# Patient Record
Sex: FEMALE | Race: WHITE | NOT HISPANIC OR LATINO | Employment: FULL TIME | URBAN - NONMETROPOLITAN AREA
[De-identification: names, ages, dates, MRNs, and addresses within clinical notes are randomized per-mention and may not be internally consistent; named-entity substitution may affect disease eponyms.]

---

## 2020-01-21 ENCOUNTER — APPOINTMENT (OUTPATIENT)
Dept: GENERAL RADIOLOGY | Facility: HOSPITAL | Age: 55
End: 2020-01-21

## 2020-01-21 ENCOUNTER — HOSPITAL ENCOUNTER (EMERGENCY)
Facility: HOSPITAL | Age: 55
Discharge: HOME | End: 2020-01-21
Attending: EMERGENCY MEDICINE | Admitting: EMERGENCY MEDICINE

## 2020-01-21 ENCOUNTER — APPOINTMENT (OUTPATIENT)
Dept: CT IMAGING | Facility: HOSPITAL | Age: 55
End: 2020-01-21

## 2020-01-21 VITALS
SYSTOLIC BLOOD PRESSURE: 141 MMHG | BODY MASS INDEX: 46.29 KG/M2 | HEIGHT: 55 IN | OXYGEN SATURATION: 94 % | TEMPERATURE: 98.1 F | DIASTOLIC BLOOD PRESSURE: 76 MMHG | HEART RATE: 90 BPM | RESPIRATION RATE: 16 BRPM | WEIGHT: 200 LBS

## 2020-01-21 DIAGNOSIS — R55 SYNCOPE, UNSPECIFIED SYNCOPE TYPE: Primary | ICD-10-CM

## 2020-01-21 DIAGNOSIS — F19.10 SUBSTANCE ABUSE (HCC): ICD-10-CM

## 2020-01-21 DIAGNOSIS — E86.0 DEHYDRATION: ICD-10-CM

## 2020-01-21 LAB
ALBUMIN SERPL-MCNC: 4.3 G/DL (ref 3.5–5.2)
ALBUMIN/GLOB SERPL: 1.3 G/DL
ALP SERPL-CCNC: 88 U/L (ref 39–117)
ALT SERPL W P-5'-P-CCNC: 15 U/L (ref 1–33)
AMPHET+METHAMPHET UR QL: POSITIVE
AMPHETAMINES UR QL: POSITIVE
ANION GAP SERPL CALCULATED.3IONS-SCNC: 13.2 MMOL/L (ref 5–15)
AST SERPL-CCNC: 14 U/L (ref 1–32)
BARBITURATES UR QL SCN: NEGATIVE
BASOPHILS # BLD AUTO: 0.07 10*3/MM3 (ref 0–0.2)
BASOPHILS NFR BLD AUTO: 0.5 % (ref 0–1.5)
BENZODIAZ UR QL SCN: NEGATIVE
BILIRUB SERPL-MCNC: 0.3 MG/DL (ref 0.2–1.2)
BUN BLD-MCNC: 14 MG/DL (ref 6–20)
BUN/CREAT SERPL: 9.9 (ref 7–25)
BUPRENORPHINE SERPL-MCNC: NEGATIVE NG/ML
CALCIUM SPEC-SCNC: 10.1 MG/DL (ref 8.6–10.5)
CANNABINOIDS SERPL QL: POSITIVE
CHLORIDE SERPL-SCNC: 100 MMOL/L (ref 98–107)
CO2 SERPL-SCNC: 25.8 MMOL/L (ref 22–29)
COCAINE UR QL: NEGATIVE
CREAT BLD-MCNC: 1.41 MG/DL (ref 0.57–1)
D DIMER PPP FEU-MCNC: 0.49 MCGFEU/ML (ref 0–0.57)
DEPRECATED RDW RBC AUTO: 43.7 FL (ref 37–54)
EOSINOPHIL # BLD AUTO: 0.1 10*3/MM3 (ref 0–0.4)
EOSINOPHIL NFR BLD AUTO: 0.7 % (ref 0.3–6.2)
ERYTHROCYTE [DISTWIDTH] IN BLOOD BY AUTOMATED COUNT: 12.7 % (ref 12.3–15.4)
GFR SERPL CREATININE-BSD FRML MDRD: 39 ML/MIN/1.73
GLOBULIN UR ELPH-MCNC: 3.3 GM/DL
GLUCOSE BLD-MCNC: 123 MG/DL (ref 65–99)
HCT VFR BLD AUTO: 45.4 % (ref 34–46.6)
HGB BLD-MCNC: 14.8 G/DL (ref 12–15.9)
HOLD SPECIMEN: NORMAL
HOLD SPECIMEN: NORMAL
IMM GRANULOCYTES # BLD AUTO: 0.05 10*3/MM3 (ref 0–0.05)
IMM GRANULOCYTES NFR BLD AUTO: 0.3 % (ref 0–0.5)
LYMPHOCYTES # BLD AUTO: 1.89 10*3/MM3 (ref 0.7–3.1)
LYMPHOCYTES NFR BLD AUTO: 12.6 % (ref 19.6–45.3)
MCH RBC QN AUTO: 30.3 PG (ref 26.6–33)
MCHC RBC AUTO-ENTMCNC: 32.6 G/DL (ref 31.5–35.7)
MCV RBC AUTO: 93 FL (ref 79–97)
METHADONE UR QL SCN: NEGATIVE
MONOCYTES # BLD AUTO: 1.22 10*3/MM3 (ref 0.1–0.9)
MONOCYTES NFR BLD AUTO: 8.1 % (ref 5–12)
NEUTROPHILS # BLD AUTO: 11.65 10*3/MM3 (ref 1.7–7)
NEUTROPHILS NFR BLD AUTO: 77.8 % (ref 42.7–76)
NRBC BLD AUTO-RTO: 0 /100 WBC (ref 0–0.2)
NT-PROBNP SERPL-MCNC: 628.3 PG/ML (ref 5–900)
OPIATES UR QL: NEGATIVE
OXYCODONE UR QL SCN: NEGATIVE
PCP UR QL SCN: NEGATIVE
PLATELET # BLD AUTO: 269 10*3/MM3 (ref 140–450)
PMV BLD AUTO: 9.2 FL (ref 6–12)
POTASSIUM BLD-SCNC: 4.5 MMOL/L (ref 3.5–5.2)
PROPOXYPH UR QL: NEGATIVE
PROT SERPL-MCNC: 7.6 G/DL (ref 6–8.5)
RBC # BLD AUTO: 4.88 10*6/MM3 (ref 3.77–5.28)
SODIUM BLD-SCNC: 139 MMOL/L (ref 136–145)
TRICYCLICS UR QL SCN: NEGATIVE
TROPONIN T SERPL-MCNC: <0.01 NG/ML (ref 0–0.03)
WBC NRBC COR # BLD: 14.98 10*3/MM3 (ref 3.4–10.8)
WHOLE BLOOD HOLD SPECIMEN: NORMAL
WHOLE BLOOD HOLD SPECIMEN: NORMAL

## 2020-01-21 PROCEDURE — 73090 X-RAY EXAM OF FOREARM: CPT

## 2020-01-21 PROCEDURE — 93005 ELECTROCARDIOGRAM TRACING: CPT | Performed by: EMERGENCY MEDICINE

## 2020-01-21 PROCEDURE — 80053 COMPREHEN METABOLIC PANEL: CPT | Performed by: EMERGENCY MEDICINE

## 2020-01-21 PROCEDURE — 85379 FIBRIN DEGRADATION QUANT: CPT | Performed by: EMERGENCY MEDICINE

## 2020-01-21 PROCEDURE — 99284 EMERGENCY DEPT VISIT MOD MDM: CPT

## 2020-01-21 PROCEDURE — 80306 DRUG TEST PRSMV INSTRMNT: CPT | Performed by: EMERGENCY MEDICINE

## 2020-01-21 PROCEDURE — 84484 ASSAY OF TROPONIN QUANT: CPT | Performed by: EMERGENCY MEDICINE

## 2020-01-21 PROCEDURE — 70450 CT HEAD/BRAIN W/O DYE: CPT

## 2020-01-21 PROCEDURE — 72170 X-RAY EXAM OF PELVIS: CPT

## 2020-01-21 PROCEDURE — 85025 COMPLETE CBC W/AUTO DIFF WBC: CPT | Performed by: EMERGENCY MEDICINE

## 2020-01-21 PROCEDURE — 83880 ASSAY OF NATRIURETIC PEPTIDE: CPT | Performed by: EMERGENCY MEDICINE

## 2020-01-21 RX ADMIN — SODIUM CHLORIDE 1000 ML: 9 INJECTION, SOLUTION INTRAVENOUS at 04:46

## 2020-01-21 NOTE — ED PROVIDER NOTES
Subjective   54-year-old female presenting with syncope.  She states that she took a few gabapentin before going to work tonight.  At work she fell asleep in the break room.  She then stood up and had a syncopal episode.  She tells me that she had a total of 4 or 5 of these episodes where she would pass out.  Unsure of how long she was out total.  At this time she complains of a headache, bilateral hip soreness and a few scattered wounds and contusions.  She denies any shortness of breath, numbness, weakness, fevers, chills, chest pain, abdominal pain.  She does not take blood thinners.          Review of Systems   Constitutional: Negative.    Eyes: Negative.    Respiratory: Negative.    Cardiovascular: Negative.    Gastrointestinal: Negative.    Genitourinary: Negative.    Musculoskeletal: Negative.    Skin: Positive for wound.   Neurological: Positive for syncope, light-headedness and headaches. Negative for dizziness, seizures, facial asymmetry, speech difficulty, weakness and numbness.   Psychiatric/Behavioral: Negative.        Past Medical History:   Diagnosis Date   • COPD (chronic obstructive pulmonary disease) (CMS/[facility])        Allergies   Allergen Reactions   • Amoxicillin Hives   • Codeine Swelling       History reviewed. No pertinent surgical history.    History reviewed. No pertinent family history.    Social History     Socioeconomic History   • Marital status:      Spouse name: Not on file   • Number of children: Not on file   • Years of education: Not on file   • Highest education level: Not on file   Tobacco Use   • Smoking status: Current Every Day Smoker     Packs/day: 1.00   Substance and Sexual Activity   • Alcohol use: Never     Frequency: Never   • Drug use: Yes     Comment: neurontin; marijuana   • Sexual activity: Defer           Objective   Physical Exam   Constitutional: She is oriented to person, place, and time. She appears well-developed and well-nourished. No distress.   HENT:    Head: Normocephalic.   Right Ear: External ear normal.   Left Ear: External ear normal.   Nose: Nose normal.   Mouth/Throat: Oropharynx is clear and moist.   Occlusion normal, midface stable   Eyes: Pupils are equal, round, and reactive to light. Conjunctivae and EOM are normal.   Neck: Normal range of motion. Neck supple.   No midline tenderness, full range of motion   Cardiovascular: Regular rhythm, normal heart sounds and intact distal pulses.   Tachycardic   Pulmonary/Chest: Effort normal and breath sounds normal. No respiratory distress.   Abdominal: Soft. Bowel sounds are normal. She exhibits no distension. There is no tenderness. There is no rebound and no guarding.   Musculoskeletal: Normal range of motion. She exhibits no edema or deformity.   Contusion and tenderness to the left forearm, mild tenderness over both hips, all other extremities/joint stable without tenderness   Neurological: She is alert and oriented to person, place, and time.   Normal strength and sensation, cranial nerves intact   Skin: Skin is warm and dry. No rash noted.   Small skin tear to the right forehead, laceration under the left eye, abrasion under the chin   Psychiatric: She has a normal mood and affect. Her behavior is normal.   Nursing note and vitals reviewed.      Laceration Repair  Date/Time: 1/21/2020 6:18 AM  Performed by: Delgado Marshall MD  Authorized by: Delgado Marshall MD     Consent:     Consent obtained:  Verbal    Consent given by:  Patient    Risks discussed:  Infection, pain and poor cosmetic result    Alternatives discussed:  No treatment  Anesthesia (see MAR for exact dosages):     Anesthesia method:  None  Laceration details:     Location:  Face    Face location:  L cheek    Length (cm):  1    Depth (mm):  2  Repair type:     Repair type:  Simple  Pre-procedure details:     Preparation:  Patient was prepped and draped in usual sterile fashion  Exploration:     Hemostasis achieved with:  Direct  pressure    Wound exploration: entire depth of wound probed and visualized      Contaminated: no    Treatment:     Area cleansed with:  Saline    Amount of cleaning:  Standard    Irrigation solution:  Sterile saline    Irrigation method:  Syringe  Skin repair:     Repair method:  Tissue adhesive  Approximation:     Approximation:  Close  Post-procedure details:     Dressing:  Open (no dressing)    Patient tolerance of procedure:  Tolerated well, no immediate complications               ED Course                                               MDM  Number of Diagnoses or Management Options  Dehydration:   Substance abuse (CMS/HCC):   Syncope, unspecified syncope type:   Diagnosis management comments: 54-year-old female with syncope.  Well-developed, well-nourished female no distress with exam as above.  Other than being tachycardic her vital signs are normal here.  Suspect this is related to her illicit use of gabapentin.  Will obtain labs, imaging, EKG.  Given she is tachycardic we will check d-dimer.  The wound under the left eye does need to be repaired, likely with glue.  Will update tetanus.  Disposition pending.    DDX: Substance abuse, syncope, arrhythmia, dehydration, PE    EKG interpreted by me: Sinus tachycardia, no acute ST/T changes, this is an abnormal EKG    Lab work is notable for mildly elevated creatinine at unknown chronicity.  Imaging is negative for any acute findings.  Urine drug screen is positive for methamphetamine.  She feels much better after some fluids.  I do feel she is safe for discharge home.  Encouraged oral hydration and outpatient follow-up.  She is comfortable with and understanding of the plan.       Amount and/or Complexity of Data Reviewed  Clinical lab tests: reviewed  Decide to obtain previous medical records or to obtain history from someone other than the patient: yes        Final diagnoses:   Syncope, unspecified syncope type   Dehydration   Substance abuse (CMS/HCC)             Delgado Marshall MD  01/21/20 0619

## 2020-06-24 ENCOUNTER — APPOINTMENT (OUTPATIENT)
Dept: GENERAL RADIOLOGY | Facility: HOSPITAL | Age: 55
End: 2020-06-24

## 2020-06-24 ENCOUNTER — HOSPITAL ENCOUNTER (EMERGENCY)
Facility: HOSPITAL | Age: 55
Discharge: HOME | End: 2020-06-24
Attending: EMERGENCY MEDICINE | Admitting: EMERGENCY MEDICINE

## 2020-06-24 VITALS
WEIGHT: 199.2 LBS | OXYGEN SATURATION: 95 % | RESPIRATION RATE: 18 BRPM | DIASTOLIC BLOOD PRESSURE: 81 MMHG | TEMPERATURE: 98.8 F | HEART RATE: 92 BPM | BODY MASS INDEX: 32.02 KG/M2 | SYSTOLIC BLOOD PRESSURE: 126 MMHG | HEIGHT: 66 IN

## 2020-06-24 DIAGNOSIS — M25.572 ARTHRALGIA OF LEFT ANKLE: Primary | ICD-10-CM

## 2020-06-24 PROCEDURE — 99282 EMERGENCY DEPT VISIT SF MDM: CPT

## 2020-06-24 PROCEDURE — 73610 X-RAY EXAM OF ANKLE: CPT

## 2020-06-24 RX ORDER — MELOXICAM 7.5 MG/1
7.5 TABLET ORAL DAILY
Qty: 14 TABLET | Refills: 0 | Status: SHIPPED | OUTPATIENT
Start: 2020-06-24

## 2020-06-24 NOTE — ED PROVIDER NOTES
Subjective   History of Present Illness  This a 54-year-old female who comes in today complaining of right ankle pain.  She reports that she has a history of an ankle fracture with plates and screws in the past.  She twisted her ankle yesterday and the swelling has continued.  Today advised her from her work that she needed to come here for evaluation.  Review of Systems   Constitutional: Negative.    HENT: Negative.    Eyes: Negative.    Respiratory: Negative.    Cardiovascular: Negative.    Gastrointestinal: Negative.    Genitourinary: Negative.    Musculoskeletal: Positive for arthralgias.   Skin: Negative.    Neurological: Negative.    Psychiatric/Behavioral: Negative.        Past Medical History:   Diagnosis Date   • COPD (chronic obstructive pulmonary disease) (CMS/[facility])        Allergies   Allergen Reactions   • Amoxicillin Hives   • Codeine Swelling       History reviewed. No pertinent surgical history.    History reviewed. No pertinent family history.    Social History     Socioeconomic History   • Marital status:      Spouse name: Not on file   • Number of children: Not on file   • Years of education: Not on file   • Highest education level: Not on file   Tobacco Use   • Smoking status: Current Every Day Smoker     Packs/day: 1.00   Substance and Sexual Activity   • Alcohol use: Never     Frequency: Never   • Drug use: Yes     Comment: neurontin; marijuana   • Sexual activity: Defer           Objective   Physical Exam   Constitutional: She appears well-developed and well-nourished.   Nursing note and vitals reviewed.  GEN: No acute distress  Head: Normocephalic, atraumatic  Eyes: Pupils equal round reactive to light  ENT: Posterior pharynx normal in appearance, oral mucosa is moist  Chest: Nontender to palpation  Cardiovascular: Regular rate  Lungs: Clear to auscultation bilaterally  Abdomen: Soft, nontender, nondistended, no peritoneal signs  Extremities: Left ankle edema, normal appearance  Neuro:  GCS 15  Psych: Mood and affect are appropriate      Procedures           ED Course  ED Course as of Jun 24 1556   Wed Jun 24, 2020   1553 I discussed the findings with the patient.  I have advised her that she has some changes to the ankle.  This could be chronic as she does have a history.  We are going to send her to Dr. Faria for further evaluation.    [TW]      ED Course User Index  [TW] Vesta Newsome, LEO                                           [facility]    Final diagnoses:   Arthralgia of left ankle            Vesta Newsome APRN  06/24/20 1551

## 2021-10-20 ENCOUNTER — TRANSCRIBE ORDERS (OUTPATIENT)
Dept: ADMINISTRATIVE | Facility: HOSPITAL | Age: 56
End: 2021-10-20

## 2021-10-20 DIAGNOSIS — Z12.31 BREAST CANCER SCREENING BY MAMMOGRAM: Primary | ICD-10-CM

## 2022-01-31 ENCOUNTER — LAB (OUTPATIENT)
Dept: LAB | Facility: HOSPITAL | Age: 57
End: 2022-01-31

## 2022-01-31 DIAGNOSIS — Z03.818 ENCOUNTER FOR PATIENT CONCERN ABOUT EXPOSURE TO INFECTIOUS ORGANISM: Primary | ICD-10-CM

## 2022-01-31 LAB — SARS-COV-2 RNA NOSE QL NAA+PROBE: NOT DETECTED

## 2022-01-31 PROCEDURE — C9803 HOPD COVID-19 SPEC COLLECT: HCPCS

## 2022-01-31 PROCEDURE — U0004 COV-19 TEST NON-CDC HGH THRU: HCPCS

## 2022-04-15 ENCOUNTER — APPOINTMENT (OUTPATIENT)
Dept: GENERAL RADIOLOGY | Facility: HOSPITAL | Age: 57
End: 2022-04-15

## 2022-04-15 ENCOUNTER — HOSPITAL ENCOUNTER (EMERGENCY)
Facility: HOSPITAL | Age: 57
Discharge: HOME | End: 2022-04-15
Attending: EMERGENCY MEDICINE | Admitting: EMERGENCY MEDICINE

## 2022-04-15 VITALS
HEIGHT: 66 IN | SYSTOLIC BLOOD PRESSURE: 170 MMHG | TEMPERATURE: 98.7 F | OXYGEN SATURATION: 95 % | BODY MASS INDEX: 29.41 KG/M2 | RESPIRATION RATE: 16 BRPM | WEIGHT: 183 LBS | HEART RATE: 97 BPM | DIASTOLIC BLOOD PRESSURE: 99 MMHG

## 2022-04-15 DIAGNOSIS — J44.9 BRONCHITIS WITH CHRONIC AIRWAY OBSTRUCTION: Primary | ICD-10-CM

## 2022-04-15 PROCEDURE — 71046 X-RAY EXAM CHEST 2 VIEWS: CPT

## 2022-04-15 PROCEDURE — 94664 DEMO&/EVAL PT USE INHALER: CPT

## 2022-04-15 PROCEDURE — 94761 N-INVAS EAR/PLS OXIMETRY MLT: CPT

## 2022-04-15 PROCEDURE — 99283 EMERGENCY DEPT VISIT LOW MDM: CPT

## 2022-04-15 PROCEDURE — 94640 AIRWAY INHALATION TREATMENT: CPT

## 2022-04-15 PROCEDURE — 94799 UNLISTED PULMONARY SVC/PX: CPT

## 2022-04-15 PROCEDURE — 0202U NFCT DS 22 TRGT SARS-COV-2: CPT | Performed by: NURSE PRACTITIONER

## 2022-04-15 RX ORDER — ALBUTEROL SULFATE 90 UG/1
2 AEROSOL, METERED RESPIRATORY (INHALATION) EVERY 4 HOURS PRN
Qty: 17 G | Refills: 0 | Status: SHIPPED | OUTPATIENT
Start: 2022-04-15

## 2022-04-15 RX ORDER — PREDNISONE 20 MG/1
20 TABLET ORAL 2 TIMES DAILY
Qty: 10 TABLET | Refills: 0 | Status: SHIPPED | OUTPATIENT
Start: 2022-04-15

## 2022-04-15 RX ORDER — IPRATROPIUM BROMIDE AND ALBUTEROL SULFATE 2.5; .5 MG/3ML; MG/3ML
3 SOLUTION RESPIRATORY (INHALATION) ONCE
Status: COMPLETED | OUTPATIENT
Start: 2022-04-15 | End: 2022-04-15

## 2022-04-15 RX ORDER — DOXYCYCLINE 100 MG/1
100 CAPSULE ORAL 2 TIMES DAILY
Qty: 20 CAPSULE | Refills: 0 | Status: SHIPPED | OUTPATIENT
Start: 2022-04-15

## 2022-04-15 RX ADMIN — IPRATROPIUM BROMIDE AND ALBUTEROL SULFATE 3 ML: 2.5; .5 SOLUTION RESPIRATORY (INHALATION) at 17:32

## 2022-04-15 NOTE — ED PROVIDER NOTES
Subjective   History of Present Illness  This is a 56 year old female who comes in today complaining of cough and congestion. She reports symptoms about two days ago. Today she reports fever and chills.   Review of Systems   Constitutional: Positive for chills and fever.   HENT: Positive for congestion.    Eyes: Negative.    Respiratory: Positive for cough.    Cardiovascular: Negative.    Gastrointestinal: Negative.    Genitourinary: Negative.    Skin: Negative.    Neurological: Negative.    Psychiatric/Behavioral: Negative.        Past Medical History:   Diagnosis Date   • COPD (chronic obstructive pulmonary disease) ([facility])        Allergies   Allergen Reactions   • Amoxicillin Hives   • Codeine Swelling       History reviewed. No pertinent surgical history.    History reviewed. No pertinent family history.    Social History     Socioeconomic History   • Marital status:    Tobacco Use   • Smoking status: Current Every Day Smoker     Packs/day: 1.00     Types: Cigarettes   • Smokeless tobacco: Never Used   Vaping Use   • Vaping Use: Every day   • Substances: Nicotine, Flavoring   • Devices: Disposable   Substance and Sexual Activity   • Alcohol use: Never   • Drug use: Not Currently     Comment: neurontin; marijuana   • Sexual activity: Defer           Objective   Physical Exam  Vitals and nursing note reviewed.   Constitutional:       Appearance: Normal appearance. She is normal weight.   Neurological:      Mental Status: She is alert.     GEN: No acute distress  Head: Normocephalic, atraumatic  Eyes: Pupils equal round reactive to light  ENT: Posterior pharynx normal in appearance, oral mucosa is moist  Chest: Nontender to palpation  Cardiovascular: Regular rate  Lungs: wheezes throughout.   Abdomen: Soft, nontender, nondistended, no peritoneal signs  Extremities: No edema, normal appearance  Neuro: GCS 15  Psych: Mood and affect are appropriate      Procedures           ED Course  ED Course as of 04/15/22  1744   Fri Apr 15, 2022   1618 The patient did not want labs completed today.  [TW]   1714 She reports improvement after neb. She states she does this a few times a year and usually requires steroids and antibiotics. She is a smoker. I have discussed the findings with the patient. I have advised her to follow up with her pcp in the next 24-48 hours. I have given her return to care instructions and she is agreeable to this plan of care.  [TW]      ED Course User Index  [TW] Vesta Newsome, APRN                                                 MDM  Number of Diagnoses or Management Options     Amount and/or Complexity of Data Reviewed  Tests in the radiology section of CPT®: ordered and reviewed  Review and summarize past medical records: yes  Discuss the patient with other providers: yes  Independent visualization of images, tracings, or specimens: yes    Risk of Complications, Morbidity, and/or Mortality  Presenting problems: low  Diagnostic procedures: low  Management options: low        Final diagnoses:   Bronchitis with chronic airway obstruction (HCC)       ED Disposition  ED Disposition     ED Disposition   Discharge    Condition   Stable    Comment   --             Provider, No Known  [facility] [address]  [phone]    Schedule an appointment as soon as possible for a visit            Medication List      New Prescriptions    albuterol sulfate  (90 Base) MCG/ACT inhaler  Commonly known as: PROVENTIL HFA;VENTOLIN HFA;PROAIR HFA  Inhale 2 puffs Every 4 (Four) Hours As Needed for Wheezing.     doxycycline 100 MG capsule  Commonly known as: MONODOX  Take 1 capsule by mouth 2 (Two) Times a Day.     predniSONE 20 MG tablet  Commonly known as: DELTASONE  Take 1 tablet by mouth 2 (Two) Times a Day.           Where to Get Your Medications      These medications were sent to [facility]/pharmacy #[id number] - [place], [place] - [address] - [phone] [facility] [phone] FX  [address]  [place] [address]    Phone: [phone]   · albuterol sulfate  (90 Base) MCG/ACT inhaler  · doxycycline 100 MG capsule  · predniSONE 20 MG tablet          Vesta Newsome, APRN  04/15/22 8329

## 2022-09-24 ENCOUNTER — APPOINTMENT (OUTPATIENT)
Dept: GENERAL RADIOLOGY | Facility: HOSPITAL | Age: 57
End: 2022-09-24

## 2022-09-24 ENCOUNTER — HOSPITAL ENCOUNTER (EMERGENCY)
Facility: HOSPITAL | Age: 57
Discharge: HOME | End: 2022-09-24
Attending: EMERGENCY MEDICINE | Admitting: EMERGENCY MEDICINE

## 2022-09-24 VITALS
OXYGEN SATURATION: 94 % | HEIGHT: 66 IN | HEART RATE: 105 BPM | RESPIRATION RATE: 18 BRPM | BODY MASS INDEX: 29.73 KG/M2 | WEIGHT: 185 LBS | DIASTOLIC BLOOD PRESSURE: 93 MMHG | TEMPERATURE: 98 F | SYSTOLIC BLOOD PRESSURE: 141 MMHG

## 2022-09-24 DIAGNOSIS — J44.1 COPD EXACERBATION: Primary | ICD-10-CM

## 2022-09-24 LAB
ALBUMIN SERPL-MCNC: 4.2 G/DL (ref 3.5–5.2)
ALBUMIN/GLOB SERPL: 1.6 G/DL
ALP SERPL-CCNC: 105 U/L (ref 39–117)
ALT SERPL W P-5'-P-CCNC: 39 U/L (ref 1–33)
ANION GAP SERPL CALCULATED.3IONS-SCNC: 23 MMOL/L (ref 5–15)
AST SERPL-CCNC: 27 U/L (ref 1–32)
B PARAPERT DNA SPEC QL NAA+PROBE: NOT DETECTED
B PERT DNA SPEC QL NAA+PROBE: NOT DETECTED
BASOPHILS # BLD AUTO: 0.06 10*3/MM3 (ref 0–0.2)
BASOPHILS NFR BLD AUTO: 0.8 % (ref 0–1.5)
BILIRUB SERPL-MCNC: 0.2 MG/DL (ref 0–1.2)
BUN SERPL-MCNC: 19 MG/DL (ref 6–20)
BUN/CREAT SERPL: 26 (ref 7–25)
C PNEUM DNA NPH QL NAA+NON-PROBE: NOT DETECTED
CALCIUM SPEC-SCNC: 9.3 MG/DL (ref 8.6–10.5)
CHLORIDE SERPL-SCNC: 105 MMOL/L (ref 98–107)
CO2 SERPL-SCNC: 24 MMOL/L (ref 22–29)
CREAT SERPL-MCNC: 0.73 MG/DL (ref 0.57–1)
DEPRECATED RDW RBC AUTO: 46.7 FL (ref 37–54)
EGFRCR SERPLBLD CKD-EPI 2021: 96.7 ML/MIN/1.73
EOSINOPHIL # BLD AUTO: 0.3 10*3/MM3 (ref 0–0.4)
EOSINOPHIL NFR BLD AUTO: 4 % (ref 0.3–6.2)
ERYTHROCYTE [DISTWIDTH] IN BLOOD BY AUTOMATED COUNT: 13.4 % (ref 12.3–15.4)
FLUAV SUBTYP SPEC NAA+PROBE: NOT DETECTED
FLUBV RNA ISLT QL NAA+PROBE: NOT DETECTED
GLOBULIN UR ELPH-MCNC: 2.7 GM/DL
GLUCOSE SERPL-MCNC: 107 MG/DL (ref 65–99)
HADV DNA SPEC NAA+PROBE: NOT DETECTED
HCOV 229E RNA SPEC QL NAA+PROBE: NOT DETECTED
HCOV HKU1 RNA SPEC QL NAA+PROBE: NOT DETECTED
HCOV NL63 RNA SPEC QL NAA+PROBE: NOT DETECTED
HCOV OC43 RNA SPEC QL NAA+PROBE: NOT DETECTED
HCT VFR BLD AUTO: 39.6 % (ref 34–46.6)
HGB BLD-MCNC: 12.9 G/DL (ref 12–15.9)
HMPV RNA NPH QL NAA+NON-PROBE: NOT DETECTED
HOLD SPECIMEN: NORMAL
HOLD SPECIMEN: NORMAL
HPIV1 RNA ISLT QL NAA+PROBE: NOT DETECTED
HPIV2 RNA SPEC QL NAA+PROBE: NOT DETECTED
HPIV3 RNA NPH QL NAA+PROBE: NOT DETECTED
HPIV4 P GENE NPH QL NAA+PROBE: NOT DETECTED
IMM GRANULOCYTES # BLD AUTO: 0.02 10*3/MM3 (ref 0–0.05)
IMM GRANULOCYTES NFR BLD AUTO: 0.3 % (ref 0–0.5)
LYMPHOCYTES # BLD AUTO: 2.37 10*3/MM3 (ref 0.7–3.1)
LYMPHOCYTES NFR BLD AUTO: 31.7 % (ref 19.6–45.3)
M PNEUMO IGG SER IA-ACNC: NOT DETECTED
MCH RBC QN AUTO: 30.6 PG (ref 26.6–33)
MCHC RBC AUTO-ENTMCNC: 32.6 G/DL (ref 31.5–35.7)
MCV RBC AUTO: 94.1 FL (ref 79–97)
MONOCYTES # BLD AUTO: 0.73 10*3/MM3 (ref 0.1–0.9)
MONOCYTES NFR BLD AUTO: 9.8 % (ref 5–12)
NEUTROPHILS NFR BLD AUTO: 4 10*3/MM3 (ref 1.7–7)
NEUTROPHILS NFR BLD AUTO: 53.4 % (ref 42.7–76)
NRBC BLD AUTO-RTO: 0 /100 WBC (ref 0–0.2)
NT-PROBNP SERPL-MCNC: 7329 PG/ML (ref 0–900)
PLATELET # BLD AUTO: 206 10*3/MM3 (ref 140–450)
PMV BLD AUTO: 9.4 FL (ref 6–12)
POTASSIUM SERPL-SCNC: 3.9 MMOL/L (ref 3.5–5.2)
PROT SERPL-MCNC: 6.9 G/DL (ref 6–8.5)
RBC # BLD AUTO: 4.21 10*6/MM3 (ref 3.77–5.28)
RHINOVIRUS RNA SPEC NAA+PROBE: NOT DETECTED
RSV RNA NPH QL NAA+NON-PROBE: NOT DETECTED
SARS-COV-2 RNA NPH QL NAA+NON-PROBE: NOT DETECTED
SODIUM SERPL-SCNC: 152 MMOL/L (ref 136–145)
TROPONIN T SERPL-MCNC: <0.01 NG/ML (ref 0–0.03)
WBC NRBC COR # BLD: 7.48 10*3/MM3 (ref 3.4–10.8)
WHOLE BLOOD HOLD COAG: NORMAL
WHOLE BLOOD HOLD SPECIMEN: NORMAL

## 2022-09-24 PROCEDURE — 99284 EMERGENCY DEPT VISIT MOD MDM: CPT

## 2022-09-24 PROCEDURE — 80053 COMPREHEN METABOLIC PANEL: CPT

## 2022-09-24 PROCEDURE — 96374 THER/PROPH/DIAG INJ IV PUSH: CPT

## 2022-09-24 PROCEDURE — 71045 X-RAY EXAM CHEST 1 VIEW: CPT

## 2022-09-24 PROCEDURE — 25010000002 METHYLPREDNISOLONE PER 125 MG: Performed by: EMERGENCY MEDICINE

## 2022-09-24 PROCEDURE — 93005 ELECTROCARDIOGRAM TRACING: CPT

## 2022-09-24 PROCEDURE — 0202U NFCT DS 22 TRGT SARS-COV-2: CPT | Performed by: EMERGENCY MEDICINE

## 2022-09-24 PROCEDURE — 84484 ASSAY OF TROPONIN QUANT: CPT

## 2022-09-24 PROCEDURE — 85025 COMPLETE CBC W/AUTO DIFF WBC: CPT

## 2022-09-24 PROCEDURE — 96375 TX/PRO/DX INJ NEW DRUG ADDON: CPT

## 2022-09-24 PROCEDURE — 94799 UNLISTED PULMONARY SVC/PX: CPT

## 2022-09-24 PROCEDURE — 94761 N-INVAS EAR/PLS OXIMETRY MLT: CPT

## 2022-09-24 PROCEDURE — 25010000002 FUROSEMIDE PER 20 MG: Performed by: EMERGENCY MEDICINE

## 2022-09-24 PROCEDURE — 83880 ASSAY OF NATRIURETIC PEPTIDE: CPT

## 2022-09-24 PROCEDURE — 94640 AIRWAY INHALATION TREATMENT: CPT

## 2022-09-24 RX ORDER — PREDNISONE 20 MG/1
TABLET ORAL
Qty: 18 TABLET | Refills: 0 | Status: SHIPPED | OUTPATIENT
Start: 2022-09-24 | End: 2022-10-03

## 2022-09-24 RX ORDER — METHYLPREDNISOLONE SODIUM SUCCINATE 125 MG/2ML
125 INJECTION, POWDER, LYOPHILIZED, FOR SOLUTION INTRAMUSCULAR; INTRAVENOUS ONCE
Status: COMPLETED | OUTPATIENT
Start: 2022-09-24 | End: 2022-09-24

## 2022-09-24 RX ORDER — ALBUTEROL SULFATE 0.63 MG/3ML
1 SOLUTION RESPIRATORY (INHALATION) EVERY 6 HOURS PRN
Qty: 20 EACH | Refills: 0 | Status: SHIPPED | OUTPATIENT
Start: 2022-09-24

## 2022-09-24 RX ORDER — IPRATROPIUM BROMIDE AND ALBUTEROL SULFATE 2.5; .5 MG/3ML; MG/3ML
3 SOLUTION RESPIRATORY (INHALATION) ONCE
Status: COMPLETED | OUTPATIENT
Start: 2022-09-24 | End: 2022-09-24

## 2022-09-24 RX ORDER — FUROSEMIDE 10 MG/ML
20 INJECTION INTRAMUSCULAR; INTRAVENOUS ONCE
Status: COMPLETED | OUTPATIENT
Start: 2022-09-24 | End: 2022-09-24

## 2022-09-24 RX ORDER — FUROSEMIDE 20 MG/1
20 TABLET ORAL DAILY
Qty: 5 TABLET | Refills: 0 | Status: SHIPPED | OUTPATIENT
Start: 2022-09-24

## 2022-09-24 RX ORDER — SODIUM CHLORIDE 0.9 % (FLUSH) 0.9 %
10 SYRINGE (ML) INJECTION AS NEEDED
Status: DISCONTINUED | OUTPATIENT
Start: 2022-09-24 | End: 2022-09-24 | Stop reason: HOSPADM

## 2022-09-24 RX ADMIN — METHYLPREDNISOLONE SODIUM SUCCINATE 125 MG: 125 INJECTION, POWDER, FOR SOLUTION INTRAMUSCULAR; INTRAVENOUS at 10:31

## 2022-09-24 RX ADMIN — IPRATROPIUM BROMIDE AND ALBUTEROL SULFATE 3 ML: .5; 3 SOLUTION RESPIRATORY (INHALATION) at 11:08

## 2022-09-24 RX ADMIN — IPRATROPIUM BROMIDE AND ALBUTEROL SULFATE 3 ML: .5; 3 SOLUTION RESPIRATORY (INHALATION) at 12:07

## 2022-09-24 RX ADMIN — FUROSEMIDE 20 MG: 10 INJECTION, SOLUTION INTRAMUSCULAR; INTRAVENOUS at 11:06

## 2022-10-12 ENCOUNTER — TRANSCRIBE ORDERS (OUTPATIENT)
Dept: ADMINISTRATIVE | Facility: HOSPITAL | Age: 57
End: 2022-10-12

## 2022-10-12 ENCOUNTER — APPOINTMENT (OUTPATIENT)
Dept: GENERAL RADIOLOGY | Facility: HOSPITAL | Age: 57
End: 2022-10-12

## 2022-10-12 ENCOUNTER — HOSPITAL ENCOUNTER (EMERGENCY)
Facility: HOSPITAL | Age: 57
Discharge: HOME | End: 2022-10-12
Attending: EMERGENCY MEDICINE | Admitting: EMERGENCY MEDICINE

## 2022-10-12 VITALS
HEART RATE: 115 BPM | WEIGHT: 207 LBS | SYSTOLIC BLOOD PRESSURE: 138 MMHG | DIASTOLIC BLOOD PRESSURE: 89 MMHG | BODY MASS INDEX: 33.27 KG/M2 | RESPIRATION RATE: 20 BRPM | HEIGHT: 66 IN | OXYGEN SATURATION: 94 % | TEMPERATURE: 97.9 F

## 2022-10-12 DIAGNOSIS — R06.02 SHORTNESS OF BREATH: ICD-10-CM

## 2022-10-12 DIAGNOSIS — Z12.31 ENCOUNTER FOR SCREENING MAMMOGRAM FOR MALIGNANT NEOPLASM OF BREAST: Primary | ICD-10-CM

## 2022-10-12 DIAGNOSIS — Z87.891 PERSONAL HISTORY OF NICOTINE DEPENDENCE: Primary | ICD-10-CM

## 2022-10-12 DIAGNOSIS — R60.9 PERIPHERAL EDEMA: Primary | ICD-10-CM

## 2022-10-12 LAB
ALBUMIN SERPL-MCNC: 3.6 G/DL (ref 3.5–5.2)
ALBUMIN/GLOB SERPL: 1.6 G/DL
ALP SERPL-CCNC: 79 U/L (ref 39–117)
ALT SERPL W P-5'-P-CCNC: 71 U/L (ref 1–33)
ANION GAP SERPL CALCULATED.3IONS-SCNC: 10 MMOL/L (ref 5–15)
AST SERPL-CCNC: 37 U/L (ref 1–32)
BASOPHILS # BLD AUTO: 0.05 10*3/MM3 (ref 0–0.2)
BASOPHILS NFR BLD AUTO: 0.5 % (ref 0–1.5)
BILIRUB SERPL-MCNC: 0.4 MG/DL (ref 0–1.2)
BUN SERPL-MCNC: 18 MG/DL (ref 6–20)
BUN/CREAT SERPL: 22 (ref 7–25)
CALCIUM SPEC-SCNC: 8.3 MG/DL (ref 8.6–10.5)
CHLORIDE SERPL-SCNC: 104 MMOL/L (ref 98–107)
CO2 SERPL-SCNC: 28 MMOL/L (ref 22–29)
CREAT SERPL-MCNC: 0.82 MG/DL (ref 0.57–1)
DEPRECATED RDW RBC AUTO: 50.9 FL (ref 37–54)
EGFRCR SERPLBLD CKD-EPI 2021: 84.1 ML/MIN/1.73
EOSINOPHIL # BLD AUTO: 0.15 10*3/MM3 (ref 0–0.4)
EOSINOPHIL NFR BLD AUTO: 1.5 % (ref 0.3–6.2)
ERYTHROCYTE [DISTWIDTH] IN BLOOD BY AUTOMATED COUNT: 14.7 % (ref 12.3–15.4)
GLOBULIN UR ELPH-MCNC: 2.3 GM/DL
GLUCOSE SERPL-MCNC: 208 MG/DL (ref 65–99)
HCT VFR BLD AUTO: 37.6 % (ref 34–46.6)
HGB BLD-MCNC: 12.3 G/DL (ref 12–15.9)
HOLD SPECIMEN: NORMAL
HOLD SPECIMEN: NORMAL
IMM GRANULOCYTES # BLD AUTO: 0.04 10*3/MM3 (ref 0–0.05)
IMM GRANULOCYTES NFR BLD AUTO: 0.4 % (ref 0–0.5)
LYMPHOCYTES # BLD AUTO: 1.5 10*3/MM3 (ref 0.7–3.1)
LYMPHOCYTES NFR BLD AUTO: 15 % (ref 19.6–45.3)
MCH RBC QN AUTO: 31 PG (ref 26.6–33)
MCHC RBC AUTO-ENTMCNC: 32.7 G/DL (ref 31.5–35.7)
MCV RBC AUTO: 94.7 FL (ref 79–97)
MONOCYTES # BLD AUTO: 0.51 10*3/MM3 (ref 0.1–0.9)
MONOCYTES NFR BLD AUTO: 5.1 % (ref 5–12)
NEUTROPHILS NFR BLD AUTO: 7.72 10*3/MM3 (ref 1.7–7)
NEUTROPHILS NFR BLD AUTO: 77.5 % (ref 42.7–76)
NRBC BLD AUTO-RTO: 0 /100 WBC (ref 0–0.2)
NT-PROBNP SERPL-MCNC: 3327 PG/ML (ref 0–900)
PLATELET # BLD AUTO: 193 10*3/MM3 (ref 140–450)
PMV BLD AUTO: 9.7 FL (ref 6–12)
POTASSIUM SERPL-SCNC: 3.9 MMOL/L (ref 3.5–5.2)
PROT SERPL-MCNC: 5.9 G/DL (ref 6–8.5)
RBC # BLD AUTO: 3.97 10*6/MM3 (ref 3.77–5.28)
SODIUM SERPL-SCNC: 142 MMOL/L (ref 136–145)
TROPONIN T SERPL-MCNC: 0.01 NG/ML (ref 0–0.03)
WBC NRBC COR # BLD: 9.97 10*3/MM3 (ref 3.4–10.8)
WHOLE BLOOD HOLD COAG: NORMAL
WHOLE BLOOD HOLD SPECIMEN: NORMAL

## 2022-10-12 PROCEDURE — 84484 ASSAY OF TROPONIN QUANT: CPT

## 2022-10-12 PROCEDURE — 83880 ASSAY OF NATRIURETIC PEPTIDE: CPT

## 2022-10-12 PROCEDURE — 85025 COMPLETE CBC W/AUTO DIFF WBC: CPT

## 2022-10-12 PROCEDURE — 71045 X-RAY EXAM CHEST 1 VIEW: CPT

## 2022-10-12 PROCEDURE — 96374 THER/PROPH/DIAG INJ IV PUSH: CPT

## 2022-10-12 PROCEDURE — 80053 COMPREHEN METABOLIC PANEL: CPT

## 2022-10-12 PROCEDURE — 93005 ELECTROCARDIOGRAM TRACING: CPT | Performed by: EMERGENCY MEDICINE

## 2022-10-12 PROCEDURE — 93005 ELECTROCARDIOGRAM TRACING: CPT

## 2022-10-12 PROCEDURE — 25010000002 FUROSEMIDE PER 20 MG: Performed by: EMERGENCY MEDICINE

## 2022-10-12 PROCEDURE — 99284 EMERGENCY DEPT VISIT MOD MDM: CPT

## 2022-10-12 RX ORDER — FUROSEMIDE 20 MG/1
20 TABLET ORAL 2 TIMES DAILY
Qty: 14 TABLET | Refills: 0 | Status: SHIPPED | OUTPATIENT
Start: 2022-10-12

## 2022-10-12 RX ORDER — FUROSEMIDE 10 MG/ML
40 INJECTION INTRAMUSCULAR; INTRAVENOUS ONCE
Status: COMPLETED | OUTPATIENT
Start: 2022-10-12 | End: 2022-10-12

## 2022-10-12 RX ORDER — SODIUM CHLORIDE 0.9 % (FLUSH) 0.9 %
10 SYRINGE (ML) INJECTION AS NEEDED
Status: DISCONTINUED | OUTPATIENT
Start: 2022-10-12 | End: 2022-10-12 | Stop reason: HOSPADM

## 2022-10-12 RX ADMIN — FUROSEMIDE 40 MG: 10 INJECTION, SOLUTION INTRAMUSCULAR; INTRAVENOUS at 14:01

## 2022-10-12 NOTE — ED PROVIDER NOTES
TRIAGE CHIEF COMPLAINT:     Nursing and triage notes reviewed    Chief Complaint   Patient presents with   • Shortness of Breath   • Leg Swelling      HPI: Samara Stringer is a 57 y.o. female who presents to the emergency department complaining of shortness of breath and swelling in her legs.  Patient states symptoms have been ongoing for approximately the past 1 to 2 months.  Patient was seen in the emergency department previously for similar symptoms.  She was treated with Lasix as well as steroids.  Patient saw her primary care doctor today and was told to come to the emergency department to get labs and be evaluated.  Patient states that her breathing has significantly improved however swelling in her legs has continued to worsen and it does not feel like the Lasix is working as well as it was previously.  She denies any chest pain.  She denies having fevers or chills.  She states she feels mildly short of breath when she lays down flat but otherwise denies shortness of breath.    REVIEW OF SYSTEMS: All other systems reviewed and are negative     PAST MEDICAL HISTORY:   Past Medical History:   Diagnosis Date   • COPD (chronic obstructive pulmonary disease) (HCC)         FAMILY HISTORY:   History reviewed. No pertinent family history.     SOCIAL HISTORY:   Social History     Socioeconomic History   • Marital status:    Tobacco Use   • Smoking status: Every Day     Packs/day: 0.50     Types: Cigarettes   • Smokeless tobacco: Never   Vaping Use   • Vaping Use: Some days   • Substances: Nicotine, Flavoring   • Devices: Disposable   Substance and Sexual Activity   • Alcohol use: Never   • Drug use: Not Currently     Comment: neurontin; marijuana   • Sexual activity: Defer        SURGICAL HISTORY:   Past Surgical History:   Procedure Laterality Date   • LEG SURGERY Left 1998   • TUBAL ABDOMINAL LIGATION          CURRENT MEDICATIONS:      Medication List      ASK your doctor about these medications    *  albuterol sulfate  (90 Base) MCG/ACT inhaler  Commonly known as: PROVENTIL HFA;VENTOLIN HFA;PROAIR HFA  Inhale 2 puffs Every 4 (Four) Hours As Needed for Wheezing.     * albuterol 0.63 MG/3ML nebulizer solution  Commonly known as: ACCUNEB  Take 3 mL by nebulization Every 6 (Six) Hours As Needed for Wheezing.     doxycycline 100 MG capsule  Commonly known as: MONODOX  Take 1 capsule by mouth 2 (Two) Times a Day.     furosemide 20 MG tablet  Commonly known as: LASIX  Take 1 tablet by mouth Daily.     meloxicam 7.5 MG tablet  Commonly known as: MOBIC  Take 1 tablet by mouth Daily.     predniSONE 20 MG tablet  Commonly known as: DELTASONE  Take 1 tablet by mouth 2 (Two) Times a Day.         * This list has 2 medication(s) that are the same as other medications prescribed for you. Read the directions carefully, and ask your doctor or other care provider to review them with you.                 ALLERGIES: Amoxicillin and Codeine     PHYSICAL EXAM:   VITAL SIGNS:   Vitals:    10/12/22 1257   BP: 149/87   Pulse: 115   Resp: 20   Temp: 97.9 °F (36.6 °C)   SpO2: 96%      CONSTITUTIONAL: Awake, oriented, appears nontoxic   HENT: Atraumatic, normocephalic, oral mucosa pink and moist, airway patent. Nares patent without drainage. External ears normal.   EYES: Conjunctivae clear  NECK: Trachea midline   CARDIOVASCULAR: Normal heart rate, Normal rhythm, No murmurs, rubs, gallops   PULMONARY/CHEST: Overall good air movement.  There are a few faint scattered wheezes and some faint crackles in the lower lobes bilaterally.  ABDOMINAL: Nondistended, soft, nontender - no rebound or guarding.  NEUROLOGIC: Nonfocal, moving all four extremities, no gross sensory or motor deficits.   EXTREMITIES: No clubbing, cyanosis.  Moderate pretibial edema in the lower extremities bilaterally  SKIN: Warm, Dry, No erythema, No rash     ED COURSE / MEDICAL DECISION MAKING:   Samara Stringer is a 57 y.o. female who presents to the emergency  department for evaluation of shortness of breath and swelling in her lower extremities.  Patient is nondistressed on arrival.  She is mildly tachycardic.  Vital signs otherwise are stable.  Patient breathing comfortably on room air.  She has moderate edema of her bilateral lower extremities.  Patient has some faint wheezes and crackles on auscultation but overall good air movement.  She has no increased work of breathing    EKG interpreted by me reveals sinus tachycardia with rate of 114 bpm.  There are a few nonspecific ST-T wave changes but no obvious acute ischemic findings.  This is an atypical appearing EKG.    Chest x-ray per radiology interpretation does not reveal any obvious acute process.    Cardiac enzymes within the normal range.  Patient's proBNP is elevated at 3300, this is improved compared to several weeks previously where it was over 7000.  Other labs largely unremarkable.    Patient given Lasix in the emergency department with good urine output.  Patient states she feels improved.  Patient is requesting to go home at this time.  I will increase her Lasix dose for the next week to see if this helps her edema.  We will plan on repeat labs soon with strict return precautions.  Patient comfortable with this plan and discharged in good condition.    DECISION TO DISCHARGE/ADMIT: see ED care timeline     FINAL IMPRESSION:   1 --peripheral edema  2 --shortness of breath  3 --     Electronically signed by: Fannie Arana MD, 10/12/2022 13:37 Fannie Burks MD  10/12/22 9942

## 2022-10-26 ENCOUNTER — TRANSCRIBE ORDERS (OUTPATIENT)
Dept: LAB | Facility: HOSPITAL | Age: 57
End: 2022-10-26

## 2022-10-26 ENCOUNTER — LAB (OUTPATIENT)
Dept: LAB | Facility: HOSPITAL | Age: 57
End: 2022-10-26

## 2022-10-26 ENCOUNTER — HOSPITAL ENCOUNTER (OUTPATIENT)
Dept: ULTRASOUND IMAGING | Facility: HOSPITAL | Age: 57
Discharge: HOME | End: 2022-10-26

## 2022-10-26 ENCOUNTER — TRANSCRIBE ORDERS (OUTPATIENT)
Dept: ADMINISTRATIVE | Facility: HOSPITAL | Age: 57
End: 2022-10-26

## 2022-10-26 DIAGNOSIS — M79.662 BILATERAL CALF PAIN: ICD-10-CM

## 2022-10-26 DIAGNOSIS — M79.604 PAIN OF RIGHT LOWER EXTREMITY: ICD-10-CM

## 2022-10-26 DIAGNOSIS — R60.0 LOCALIZED EDEMA: Primary | ICD-10-CM

## 2022-10-26 DIAGNOSIS — R60.9 EDEMA, UNSPECIFIED TYPE: ICD-10-CM

## 2022-10-26 DIAGNOSIS — M79.605 PAIN OF LEFT LOWER EXTREMITY: ICD-10-CM

## 2022-10-26 DIAGNOSIS — R53.83 TIREDNESS: ICD-10-CM

## 2022-10-26 DIAGNOSIS — M79.661 BILATERAL CALF PAIN: ICD-10-CM

## 2022-10-26 DIAGNOSIS — R73.09 IMPAIRED GLUCOSE TOLERANCE TEST: ICD-10-CM

## 2022-10-26 DIAGNOSIS — R60.0 LOCALIZED EDEMA: ICD-10-CM

## 2022-10-26 DIAGNOSIS — R60.9 EDEMA, UNSPECIFIED TYPE: Primary | ICD-10-CM

## 2022-10-26 LAB
ALBUMIN SERPL-MCNC: 3.8 G/DL (ref 3.5–5.2)
ALBUMIN/GLOB SERPL: 1.3 G/DL
ALP SERPL-CCNC: 93 U/L (ref 39–117)
ALT SERPL W P-5'-P-CCNC: 24 U/L (ref 1–33)
ANION GAP SERPL CALCULATED.3IONS-SCNC: 11.7 MMOL/L (ref 5–15)
AST SERPL-CCNC: 25 U/L (ref 1–32)
BILIRUB SERPL-MCNC: 0.5 MG/DL (ref 0–1.2)
BUN SERPL-MCNC: 20 MG/DL (ref 6–20)
BUN/CREAT SERPL: 17.4 (ref 7–25)
CALCIUM SPEC-SCNC: 8.9 MG/DL (ref 8.6–10.5)
CHLORIDE SERPL-SCNC: 100 MMOL/L (ref 98–107)
CO2 SERPL-SCNC: 29.3 MMOL/L (ref 22–29)
CREAT SERPL-MCNC: 1.15 MG/DL (ref 0.57–1)
DEPRECATED RDW RBC AUTO: 47.5 FL (ref 37–54)
EGFRCR SERPLBLD CKD-EPI 2021: 56 ML/MIN/1.73
ERYTHROCYTE [DISTWIDTH] IN BLOOD BY AUTOMATED COUNT: 14 % (ref 12.3–15.4)
GLOBULIN UR ELPH-MCNC: 2.9 GM/DL
GLUCOSE SERPL-MCNC: 109 MG/DL (ref 65–99)
HBA1C MFR BLD: 5.9 % (ref 4.8–5.6)
HCT VFR BLD AUTO: 39.9 % (ref 34–46.6)
HGB BLD-MCNC: 13.3 G/DL (ref 12–15.9)
MCH RBC QN AUTO: 30.7 PG (ref 26.6–33)
MCHC RBC AUTO-ENTMCNC: 33.3 G/DL (ref 31.5–35.7)
MCV RBC AUTO: 92.1 FL (ref 79–97)
PLATELET # BLD AUTO: 282 10*3/MM3 (ref 140–450)
PMV BLD AUTO: 9.3 FL (ref 6–12)
POTASSIUM SERPL-SCNC: 3.6 MMOL/L (ref 3.5–5.2)
PROT SERPL-MCNC: 6.7 G/DL (ref 6–8.5)
RBC # BLD AUTO: 4.33 10*6/MM3 (ref 3.77–5.28)
SODIUM SERPL-SCNC: 141 MMOL/L (ref 136–145)
TSH SERPL DL<=0.05 MIU/L-ACNC: 2.69 UIU/ML (ref 0.27–4.2)
WBC NRBC COR # BLD: 9.48 10*3/MM3 (ref 3.4–10.8)

## 2022-10-26 PROCEDURE — 93970 EXTREMITY STUDY: CPT

## 2022-10-26 PROCEDURE — 83036 HEMOGLOBIN GLYCOSYLATED A1C: CPT

## 2022-10-26 PROCEDURE — 80050 GENERAL HEALTH PANEL: CPT

## 2022-10-26 PROCEDURE — 36415 COLL VENOUS BLD VENIPUNCTURE: CPT

## 2022-12-20 ENCOUNTER — HOSPITAL ENCOUNTER (OUTPATIENT)
Dept: CT IMAGING | Facility: HOSPITAL | Age: 57
Discharge: HOME | End: 2022-12-20
Admitting: NURSE PRACTITIONER

## 2022-12-20 DIAGNOSIS — Z87.891 PERSONAL HISTORY OF NICOTINE DEPENDENCE: ICD-10-CM

## 2022-12-20 PROCEDURE — 71271 CT THORAX LUNG CANCER SCR C-: CPT

## 2023-12-17 ENCOUNTER — HOSPITAL ENCOUNTER (EMERGENCY)
Facility: HOSPITAL | Age: 58
Discharge: HOME | End: 2023-12-17
Attending: EMERGENCY MEDICINE | Admitting: EMERGENCY MEDICINE
Payer: COMMERCIAL

## 2023-12-17 ENCOUNTER — APPOINTMENT (OUTPATIENT)
Dept: GENERAL RADIOLOGY | Facility: HOSPITAL | Age: 58
End: 2023-12-17
Payer: COMMERCIAL

## 2023-12-17 VITALS
OXYGEN SATURATION: 95 % | TEMPERATURE: 97.9 F | HEART RATE: 116 BPM | WEIGHT: 180 LBS | SYSTOLIC BLOOD PRESSURE: 128 MMHG | BODY MASS INDEX: 28.93 KG/M2 | RESPIRATION RATE: 18 BRPM | HEIGHT: 66 IN | DIASTOLIC BLOOD PRESSURE: 89 MMHG

## 2023-12-17 DIAGNOSIS — I87.2 ACUTE BILATERAL VENOUS STASIS DERMATITIS: ICD-10-CM

## 2023-12-17 DIAGNOSIS — I50.9 ACUTE ON CHRONIC CONGESTIVE HEART FAILURE, UNSPECIFIED HEART FAILURE TYPE: Primary | ICD-10-CM

## 2023-12-17 LAB
ALBUMIN SERPL-MCNC: 4.1 G/DL (ref 3.5–5.2)
ALBUMIN/GLOB SERPL: 1.9 G/DL
ALP SERPL-CCNC: 103 U/L (ref 39–117)
ALT SERPL W P-5'-P-CCNC: 30 U/L (ref 1–33)
ANION GAP SERPL CALCULATED.3IONS-SCNC: 8.9 MMOL/L (ref 5–15)
AST SERPL-CCNC: 27 U/L (ref 1–32)
BASOPHILS # BLD AUTO: 0.05 10*3/MM3 (ref 0–0.2)
BASOPHILS NFR BLD AUTO: 0.7 % (ref 0–1.5)
BILIRUB SERPL-MCNC: 1 MG/DL (ref 0–1.2)
BUN SERPL-MCNC: 20 MG/DL (ref 6–20)
BUN/CREAT SERPL: 24.1 (ref 7–25)
CALCIUM SPEC-SCNC: 9.4 MG/DL (ref 8.6–10.5)
CHLORIDE SERPL-SCNC: 105 MMOL/L (ref 98–107)
CO2 SERPL-SCNC: 29.1 MMOL/L (ref 22–29)
CREAT SERPL-MCNC: 0.83 MG/DL (ref 0.57–1)
DEPRECATED RDW RBC AUTO: 60.5 FL (ref 37–54)
EGFRCR SERPLBLD CKD-EPI 2021: 82.3 ML/MIN/1.73
EOSINOPHIL # BLD AUTO: 0.07 10*3/MM3 (ref 0–0.4)
EOSINOPHIL NFR BLD AUTO: 1 % (ref 0.3–6.2)
ERYTHROCYTE [DISTWIDTH] IN BLOOD BY AUTOMATED COUNT: 17.1 % (ref 12.3–15.4)
GEN 5 2HR TROPONIN T REFLEX: 34 NG/L
GLOBULIN UR ELPH-MCNC: 2.2 GM/DL
GLUCOSE SERPL-MCNC: 105 MG/DL (ref 65–99)
HCT VFR BLD AUTO: 39 % (ref 34–46.6)
HGB BLD-MCNC: 12.7 G/DL (ref 12–15.9)
IMM GRANULOCYTES # BLD AUTO: 0.01 10*3/MM3 (ref 0–0.05)
IMM GRANULOCYTES NFR BLD AUTO: 0.1 % (ref 0–0.5)
LYMPHOCYTES # BLD AUTO: 1.15 10*3/MM3 (ref 0.7–3.1)
LYMPHOCYTES NFR BLD AUTO: 16.5 % (ref 19.6–45.3)
MCH RBC QN AUTO: 31.5 PG (ref 26.6–33)
MCHC RBC AUTO-ENTMCNC: 32.6 G/DL (ref 31.5–35.7)
MCV RBC AUTO: 96.8 FL (ref 79–97)
MONOCYTES # BLD AUTO: 0.51 10*3/MM3 (ref 0.1–0.9)
MONOCYTES NFR BLD AUTO: 7.3 % (ref 5–12)
NEUTROPHILS NFR BLD AUTO: 5.17 10*3/MM3 (ref 1.7–7)
NEUTROPHILS NFR BLD AUTO: 74.4 % (ref 42.7–76)
NRBC BLD AUTO-RTO: 0 /100 WBC (ref 0–0.2)
NT-PROBNP SERPL-MCNC: 8755 PG/ML (ref 0–900)
PLATELET # BLD AUTO: 161 10*3/MM3 (ref 140–450)
PMV BLD AUTO: 10 FL (ref 6–12)
POTASSIUM SERPL-SCNC: 4.2 MMOL/L (ref 3.5–5.2)
PROT SERPL-MCNC: 6.3 G/DL (ref 6–8.5)
RBC # BLD AUTO: 4.03 10*6/MM3 (ref 3.77–5.28)
SODIUM SERPL-SCNC: 143 MMOL/L (ref 136–145)
TROPONIN T DELTA: -3 NG/L
TROPONIN T SERPL HS-MCNC: 37 NG/L
WBC NRBC COR # BLD AUTO: 6.96 10*3/MM3 (ref 3.4–10.8)

## 2023-12-17 PROCEDURE — 99284 EMERGENCY DEPT VISIT MOD MDM: CPT

## 2023-12-17 PROCEDURE — 25010000002 FUROSEMIDE PER 20 MG: Performed by: NURSE PRACTITIONER

## 2023-12-17 PROCEDURE — 84484 ASSAY OF TROPONIN QUANT: CPT | Performed by: NURSE PRACTITIONER

## 2023-12-17 PROCEDURE — 96374 THER/PROPH/DIAG INJ IV PUSH: CPT

## 2023-12-17 PROCEDURE — 83880 ASSAY OF NATRIURETIC PEPTIDE: CPT | Performed by: NURSE PRACTITIONER

## 2023-12-17 PROCEDURE — 85025 COMPLETE CBC W/AUTO DIFF WBC: CPT | Performed by: NURSE PRACTITIONER

## 2023-12-17 PROCEDURE — 93005 ELECTROCARDIOGRAM TRACING: CPT | Performed by: NURSE PRACTITIONER

## 2023-12-17 PROCEDURE — 71045 X-RAY EXAM CHEST 1 VIEW: CPT

## 2023-12-17 PROCEDURE — 80053 COMPREHEN METABOLIC PANEL: CPT | Performed by: NURSE PRACTITIONER

## 2023-12-17 RX ORDER — FUROSEMIDE 10 MG/ML
80 INJECTION INTRAMUSCULAR; INTRAVENOUS ONCE
Status: COMPLETED | OUTPATIENT
Start: 2023-12-17 | End: 2023-12-17

## 2023-12-17 RX ORDER — SODIUM CHLORIDE 0.9 % (FLUSH) 0.9 %
10 SYRINGE (ML) INJECTION AS NEEDED
Status: DISCONTINUED | OUTPATIENT
Start: 2023-12-17 | End: 2023-12-17 | Stop reason: HOSPADM

## 2023-12-17 RX ADMIN — FUROSEMIDE 80 MG: 10 INJECTION, SOLUTION INTRAMUSCULAR; INTRAVENOUS at 14:18

## 2023-12-17 NOTE — Clinical Note
[facility] EMERGENCY DEPARTMENT  [address] [address]  Phone: [phone]    Samara Stringer was seen and treated in our emergency department on 12/17/2023.  She may return to work on 12/19/2023.         Thank you for choosing [facility].    Manoj Peralta APRN

## 2023-12-17 NOTE — ED PROVIDER NOTES
Subjective  History of Present Illness:    Chief Complaint: Bilateral lower extremity erythema and swelling  History of Present Illness: This is a 58-year-old female patient comes into the ED today complaining of bilateral lower extremity swelling and erythema.  Patient states she has a history of congestive heart failure has been recently placed on Lasix 20 mg twice daily.  Patient states that over the last week she has had fluid weeping from her bilateral lower extremities and has had worsening erythema.      Nurses Notes reviewed and agree, including vitals, allergies, social history and prior medical history.       Allergies:    Amoxicillin and Codeine      Past Surgical History:   Procedure Laterality Date    LEG SURGERY Left 1998    TUBAL ABDOMINAL LIGATION           Social History     Socioeconomic History    Marital status:    Tobacco Use    Smoking status: Every Day     Packs/day: .5     Types: Cigarettes    Smokeless tobacco: Never   Vaping Use    Vaping Use: Some days    Substances: Nicotine, Flavoring    Devices: Disposable   Substance and Sexual Activity    Alcohol use: Never    Drug use: Not Currently     Comment: neurontin; marijuana    Sexual activity: Defer         History reviewed. No pertinent family history.    REVIEW OF SYSTEMS: All systems reviewed and not pertinent unless noted.    Review of Systems   Respiratory:  Positive for shortness of breath.    Cardiovascular:  Positive for leg swelling.   Skin:  Positive for color change and wound.   All other systems reviewed and are negative.      Objective    Physical Exam  Vitals and nursing note reviewed.   Constitutional:       Appearance: Normal appearance.   HENT:      Head: Normocephalic and atraumatic.   Eyes:      Extraocular Movements: Extraocular movements intact.      Pupils: Pupils are equal, round, and reactive to light.   Cardiovascular:      Rate and Rhythm: Normal rate and regular rhythm.      Pulses: Normal pulses.      Heart  sounds: Normal heart sounds.   Pulmonary:      Effort: Pulmonary effort is normal.      Comments: Decreased in the bases bilaterally  Abdominal:      General: Abdomen is flat. Bowel sounds are normal.      Palpations: Abdomen is soft.   Musculoskeletal:      Cervical back: Normal range of motion and neck supple.      Right lower leg: Edema present.      Left lower leg: Edema present.   Skin:     Capillary Refill: Capillary refill takes less than 2 seconds.      Findings: Erythema present.   Neurological:      General: No focal deficit present.      Mental Status: She is alert and oriented to person, place, and time. Mental status is at baseline.      GCS: GCS eye subscore is 4. GCS verbal subscore is 5. GCS motor subscore is 6.      Sensory: Sensation is intact.      Motor: Motor function is intact.      Gait: Gait is intact.   Psychiatric:         Attention and Perception: Attention and perception normal.         Mood and Affect: Mood and affect normal.         Speech: Speech normal.         Behavior: Behavior normal. Behavior is cooperative.           Procedures    ED Course:         Lab Results (last 24 hours)       Procedure Component Value Units Date/Time    CBC & Differential [[id number]]  (Abnormal) Collected: 12/17/23 1337    Specimen: Blood Updated: 12/17/23 1344    Narrative:      The following orders were created for panel order CBC & Differential.  Procedure                               Abnormality         Status                     ---------                               -----------         ------                     CBC Auto Differential[[id number]]        Abnormal            Final result                 Please view results for these tests on the individual orders.    Comprehensive Metabolic Panel [[id number]]  (Abnormal) Collected: 12/17/23 1337    Specimen: Blood Updated: 12/17/23 1401     Glucose 105 mg/dL      BUN 20 mg/dL      Creatinine 0.83 mg/dL      Sodium 143 mmol/L      Potassium 4.2 mmol/L       Chloride 105 mmol/L      CO2 29.1 mmol/L      Calcium 9.4 mg/dL      Total Protein 6.3 g/dL      Albumin 4.1 g/dL      ALT (SGPT) 30 U/L      AST (SGOT) 27 U/L      Alkaline Phosphatase 103 U/L      Total Bilirubin 1.0 mg/dL      Globulin 2.2 gm/dL      A/G Ratio 1.9 g/dL      BUN/Creatinine Ratio 24.1     Anion Gap 8.9 mmol/L      eGFR 82.3 mL/min/1.73     Narrative:      GFR Normal >60  Chronic Kidney Disease <60  Kidney Failure <15      BNP [[id number]]  (Abnormal) Collected: 12/17/23 1337    Specimen: Blood Updated: 12/17/23 1405     proBNP 8,755.0 pg/mL     Narrative:      This assay is used as an aid in the diagnosis of individuals suspected of having heart failure. It can be used as an aid in the diagnosis of acute decompensated heart failure (ADHF) in patients presenting with signs and symptoms of ADHF to the emergency department (ED). In addition, NT-proBNP of <300 pg/mL indicates ADHF is not likely.    Age Range Result Interpretation  NT-proBNP Concentration (pg/mL:      <50             Positive            >450                   Gray                 300-450                    Negative             <300    50-75           Positive            >900                  Gray                300-900                  Negative            <300      >75             Positive            >1800                  Gray                300-1800                  Negative            <300    High Sensitivity Troponin T [[id number]]  (Abnormal) Collected: 12/17/23 1337    Specimen: Blood Updated: 12/17/23 1404     HS Troponin T 37 ng/L     Narrative:      High Sensitive Troponin T Reference Range:  <14.0 ng/L- Negative Female for AMI  <22.0 ng/L- Negative Male for AMI  >=14 - Abnormal Female indicating possible myocardial injury.  >=22 - Abnormal Male indicating possible myocardial injury.   Clinicians would have to utilize clinical acumen, EKG, Troponin, and serial changes to determine if it is an Acute Myocardial Infarction or  myocardial injury due to an underlying chronic condition.         CBC Auto Differential [[id number]]  (Abnormal) Collected: 12/17/23 1337    Specimen: Blood Updated: 12/17/23 1344     WBC 6.96 10*3/mm3      RBC 4.03 10*6/mm3      Hemoglobin 12.7 g/dL      Hematocrit 39.0 %      MCV 96.8 fL      MCH 31.5 pg      MCHC 32.6 g/dL      RDW 17.1 %      RDW-SD 60.5 fl      MPV 10.0 fL      Platelets 161 10*3/mm3      Neutrophil % 74.4 %      Lymphocyte % 16.5 %      Monocyte % 7.3 %      Eosinophil % 1.0 %      Basophil % 0.7 %      Immature Grans % 0.1 %      Neutrophils, Absolute 5.17 10*3/mm3      Lymphocytes, Absolute 1.15 10*3/mm3      Monocytes, Absolute 0.51 10*3/mm3      Eosinophils, Absolute 0.07 10*3/mm3      Basophils, Absolute 0.05 10*3/mm3      Immature Grans, Absolute 0.01 10*3/mm3      nRBC 0.0 /100 WBC     High Sensitivity Troponin T 2Hr [[id number]]  (Abnormal) Collected: 12/17/23 1550    Specimen: Blood Updated: 12/17/23 1618     HS Troponin T 34 ng/L      Troponin T Delta -3 ng/L     Narrative:      High Sensitive Troponin T Reference Range:  <14.0 ng/L- Negative Female for AMI  <22.0 ng/L- Negative Male for AMI  >=14 - Abnormal Female indicating possible myocardial injury.  >=22 - Abnormal Male indicating possible myocardial injury.   Clinicians would have to utilize clinical acumen, EKG, Troponin, and serial changes to determine if it is an Acute Myocardial Infarction or myocardial injury due to an underlying chronic condition.                  XR Chest 1 View    Result Date: 12/17/2023  PROCEDURE: XR CHEST 1 VW-  HISTORY: Shortness of breath, peripheral edema  COMPARISON: October 12, 2022.  FINDINGS: The heart is mildly enlarged.. The lungs are clear. The mediastinum is unremarkable. There is no pneumothorax.  There are no acute osseous abnormalities.      Impression: Mild cardiomegaly without acute infiltrate..      This report was signed and finalized on 12/17/2023 1:49 PM by Korin Hurtado MD.         Medical Decision Making  This is a 58-year-old female patient comes into the ED today complaining of bilateral lower extremity swelling and erythema.  Patient states she has a history of congestive heart failure has been recently placed on Lasix 20 mg twice daily.  Patient states that over the last week she has had fluid weeping from her bilateral lower extremities and has had worsening erythema.    DDX: includes but is not limited to: Cellulitis, venous stasis ulceration, heart failure exacerbation, other    Amount and/or Complexity of Data Reviewed  External Data Reviewed:      Details: I have personally reviewed labs, radiology EKG and notes from patient's chart  Labs: ordered. Decision-making details documented in ED Course.     Details: I have personally reviewed and documented all results  Radiology: ordered. Decision-making details documented in ED Course.     Details: I have personally reviewed and documented all results  ECG/medicine tests: ordered. Decision-making details documented in ED Course.     Details: I have personally reviewed and documented all results  Discussion of management or test interpretation with external provider(s): Discussed assessment, treatment and plan with ER attending    Risk  Prescription drug management.  Risk Details: I have discussed with patient the finding of the test preformed today. Patient has been diagnosed with heart failure exacerbation, venous stasis dermatitis and will be discharged home.  Patient requested to follow-up with her [facility] care provider, Dr. Layton, cardiology within the next 7 days for reevaluation. Strict return precautions have been given and patient verbalizes understanding                  Final diagnoses:   Acute on chronic congestive heart failure, unspecified heart failure type   Acute bilateral venous stasis dermatitis          Manoj Peralta, APRN  12/17/23 1604

## 2024-01-10 ENCOUNTER — OFFICE VISIT (OUTPATIENT)
Dept: CARDIOLOGY | Facility: CLINIC | Age: 59
End: 2024-01-10
Payer: COMMERCIAL

## 2024-01-10 VITALS
SYSTOLIC BLOOD PRESSURE: 110 MMHG | HEART RATE: 108 BPM | BODY MASS INDEX: 30.22 KG/M2 | HEIGHT: 66 IN | DIASTOLIC BLOOD PRESSURE: 78 MMHG | OXYGEN SATURATION: 99 % | WEIGHT: 188 LBS

## 2024-01-10 DIAGNOSIS — J43.9 PULMONARY EMPHYSEMA, UNSPECIFIED EMPHYSEMA TYPE: Chronic | ICD-10-CM

## 2024-01-10 DIAGNOSIS — Z72.0 TOBACCO ABUSE: Chronic | ICD-10-CM

## 2024-01-10 DIAGNOSIS — R60.0 BILATERAL LEG EDEMA: Chronic | ICD-10-CM

## 2024-01-10 DIAGNOSIS — I50.9 ACUTE ON CHRONIC HEART FAILURE, UNSPECIFIED HEART FAILURE TYPE: Primary | ICD-10-CM

## 2024-01-10 DIAGNOSIS — R73.03 PREDIABETES: Chronic | ICD-10-CM

## 2024-01-10 DIAGNOSIS — R79.89 ELEVATED TROPONIN: ICD-10-CM

## 2024-01-10 RX ORDER — ESCITALOPRAM OXALATE 10 MG/1
10 TABLET ORAL DAILY
COMMUNITY

## 2024-01-10 RX ORDER — BUDESONIDE AND FORMOTEROL FUMARATE DIHYDRATE 160; 4.5 UG/1; UG/1
2 AEROSOL RESPIRATORY (INHALATION)
COMMUNITY

## 2024-01-10 RX ORDER — ASPIRIN 81 MG/1
81 TABLET ORAL DAILY
Qty: 30 TABLET | Refills: 2 | Status: SHIPPED | OUTPATIENT
Start: 2024-01-10 | End: 2024-04-09

## 2024-01-10 RX ORDER — ROSUVASTATIN CALCIUM 10 MG/1
10 TABLET, COATED ORAL DAILY
COMMUNITY

## 2024-01-10 RX ORDER — SACUBITRIL AND VALSARTAN 97; 103 MG/1; MG/1
1 TABLET, FILM COATED ORAL 2 TIMES DAILY
COMMUNITY

## 2024-01-10 RX ORDER — SPIRONOLACTONE 25 MG/1
25 TABLET ORAL DAILY
COMMUNITY

## 2024-01-10 RX ORDER — FUROSEMIDE 40 MG/1
40 TABLET ORAL DAILY
COMMUNITY

## 2024-01-10 NOTE — PROGRESS NOTES
[facility] Cardiology OP Consult Note    Samara Stringer  [id number]  01/10/2024    Referred By: Referring, Self    Chief Complaint   Patient presents with    Consult    Congestive Heart Failure       Subjective     History of Present Illness:   Samara Stringer is a 58 y.o. female with history of known heart failure who presents to the Cardiology Clinic for evaluation of her heart failure.  Patient was previously seen by Dr. Wang and wants to transfer her care to our office.  She was diagnosed with heart failure in 2022 and has been on Entresto and spironolactone for quite some time.  She recently went to the ER with worsening leg swelling which is a new symptom for her.  Has been worsening over the past few weeks.  Says that she has dyspnea on exertion which has been stable.  Does have some orthopnea but no PND.  Does have occasional chest pains.  She has no previous cardiac history.  No known history of diabetes or hypertension.  She does smoke daily.    Past Cardiac Testin. Last Coronary Angio: none  2. Last Echo: Obtaining records  3. Prior Stress Testing: None  4. Prior Holter Monitor: None    No specialty comments available.    Review of Systems   Constitutional: Negative.    HENT: Negative.     Respiratory:  Positive for chest tightness and shortness of breath.    Cardiovascular:  Positive for leg swelling. Negative for chest pain and palpitations.   Musculoskeletal: Negative.    Neurological: Negative.        Past Medical History:   Diagnosis Date    COPD (chronic obstructive pulmonary disease)        Past Surgical History:   Procedure Laterality Date    LEG SURGERY Left     TUBAL ABDOMINAL LIGATION         No family history on file.    Social History     Socioeconomic History    Marital status:    Tobacco Use    Smoking status: Every Day     Packs/day: .5     Types: Cigarettes     Passive exposure: Current    Smokeless tobacco: Never   Vaping Use    Vaping  "Use: Some days    Substances: Nicotine, Flavoring    Devices: Disposable   Substance and Sexual Activity    Alcohol use: Never    Drug use: Not Currently     Comment: neurontin; marijuana    Sexual activity: Defer         Current Outpatient Medications:     albuterol (ACCUNEB) 0.63 MG/3ML nebulizer solution, Take 3 mL by nebulization Every 6 (Six) Hours As Needed for Wheezing., Disp: 20 each, Rfl: 0    albuterol sulfate  (90 Base) MCG/ACT inhaler, Inhale 2 puffs Every 4 (Four) Hours As Needed for Wheezing., Disp: 17 g, Rfl: 0    budesonide-formoterol (SYMBICORT) 160-4.5 MCG/ACT inhaler, Inhale 2 puffs 2 (Two) Times a Day. Pt takes prn, Disp: , Rfl:     escitalopram (LEXAPRO) 10 MG tablet, Take 1 tablet by mouth Daily., Disp: , Rfl:     furosemide (LASIX) 40 MG tablet, Take 1 tablet by mouth Daily. Pt taking twice a day, Disp: , Rfl:     rosuvastatin (CRESTOR) 10 MG tablet, Take 1 tablet by mouth Daily., Disp: , Rfl:     sacubitril-valsartan (Entresto)  MG tablet, Take 1 tablet by mouth 2 (Two) Times a Day., Disp: , Rfl:     spironolactone (ALDACTONE) 25 MG tablet, Take 1 tablet by mouth Daily., Disp: , Rfl:     dapagliflozin Propanediol (Farxiga) 10 MG tablet, Take 10 mg by mouth Daily for 120 days., Disp: 30 tablet, Rfl: 3    doxycycline (MONODOX) 100 MG capsule, Take 1 capsule by mouth 2 (Two) Times a Day., Disp: 20 capsule, Rfl: 0    meloxicam (MOBIC) 7.5 MG tablet, Take 1 tablet by mouth Daily., Disp: 14 tablet, Rfl: 0    predniSONE (DELTASONE) 20 MG tablet, Take 1 tablet by mouth 2 (Two) Times a Day., Disp: 10 tablet, Rfl: 0    Allergies   Allergen Reactions    Amoxicillin Hives    Codeine Swelling          Objective   Vitals:    01/10/24 0907   BP: 110/78   BP Location: Left arm   Patient Position: Sitting   Cuff Size: Adult   Pulse: 108   SpO2: 99%   Weight: 85.3 kg (188 lb)   Height: 167.6 cm (66\")       Vitals reviewed.   Constitutional:       Appearance: Healthy appearance. Not in distress. "   Neck:      Vascular: JVD present.   Pulmonary:      Effort: Pulmonary effort is normal.      Breath sounds: Normal breath sounds.   Cardiovascular:      Normal rate. Irregular rhythm. Normal S1. Normal S2.       Murmurs: There is no murmur.      No gallop.  No click. No rub.   Pulses:     Intact distal pulses.   Edema:     Thigh: bilateral 2+ edema of the thigh.     Pretibial: bilateral 2+ edema of the pretibial area.     Ankle: bilateral 2+ edema of the ankle.     Feet: bilateral 2+ edema of the feet.     Comments: Chronic venous stasis changes and venous wounds on the legs bilaterally.  Abdominal:      General: There is no distension.      Palpations: Abdomen is soft.      Tenderness: There is no abdominal tenderness.   Skin:     General: Skin is warm and dry.         Results Review:   I reviewed the patient's new clinical results.  I reviewed the patient's other test results and agree with the interpretation  I personally viewed and interpreted the patient's EKG/Telemetry data      ECG 12 Lead    Date/Time: 1/10/2024 10:02 AM  Performed by: Jack Turpin MD    Authorized by: Jack Turpin MD  Comparison: compared with previous ECG from 12/17/2023  Rhythm: sinus rhythm and sinus tachycardia  Rate: normal  Conduction: conduction normal  ST Segments: ST segments normal  QRS axis: normal  Other findings: T wave abnormality  Other findings comments: Poor R wave progression, possible anterior infarct, age undetermined    Clinical impression: abnormal EKG             Assessment / Plan:   Diagnoses and all orders for this visit:    1. Acute on chronic heart failure, unspecified heart failure type (Primary)  -     dapagliflozin Propanediol (Farxiga) 10 MG tablet; Take 10 mg by mouth Daily for 120 days.  Dispense: 30 tablet; Refill: 3  -     Lipid Panel; Future  -     Hemoglobin A1c; Future  -     Adult Transthoracic Echo Complete W/ Cont if Necessary Per Protocol; Future  -     Ambulatory Referral to Wound  Clinic  Known heart failure since October 2022.  EF unknown.  No previous ischemic workup.  Recent hospitalization showed significant elevation in BNP.  Chest x-ray personally reviewed and did not show any graciela pulmonary edema.  Renal function within normal limits.  Clinically, patient appears volume overloaded with significant leg edema as well as some mild JVD.  No acute distress to require hospitalization at this time.  --Increase Lasix to 40 twice daily.  Monitor weights and blood pressures daily.  If ineffective, increase to 80 twice daily.  -- Continue Entresto and spironolactone.  -- Start Farxiga 10 mg daily  -- Repeat echo.  Requesting old echo for comparison.  Will hold off decision on beta-blocker until EF clarified.    2. Elevated troponin  Mild troponin elevation in the ER.  ECG nonischemic.    -- Will need some type of ischemic workup after more euvolemic  -- Continue rosuvastatin.   -- Start aspirin 81 mg daily    3. Bilateral leg edema  Likely sequelae of heart failure.  Already chronic venous stasis changes along with some venous wounds.  No indication of arterial disease however.  She is a smoker, therefore will need to address potential for PAD at a later point in time  -- Referring to wound care    4. Prediabetes  Last A1c was 5.9.  --Checking A1c and lipids.  Starting Farxiga as above.    5. Tobacco abuse  -- Counseled on pursuing smoking cessation.    6. Pulmonary emphysema, unspecified emphysema type  -- Recommended she follow-up with her primary care and get referral for pulmonology.  Currently has oxygen that she wears at night.  Probably needs to wear it continuously.    Preventative Cardiology:   Tobacco Cessation: Cessation Counseling Provided   Obstructive Sleep Apnea Screening: Deffered  AAA Screening: Deffered  Peripheral Arterial Disease Screening: Completed    Follow Up:   Return in about 2 weeks (around 1/24/2024).    Thank you for allowing me to participate in the care of your  patient. Please to not hesitate to contact me with additional questions or concerns.     I spent over 60 minutes evaluating, treating, counseling, coordinating patient care, reviewing old records, and documenting.    Jack Turpin MD  01/10/2024  09:48 EST

## 2024-01-10 NOTE — PATIENT INSTRUCTIONS
Get repeat echo    INCREASE Lasix to 40 twice daily. Increase to 80 twice daily if not 40 not working    START Farxiga 10mg daily    CONTINUE Entresto and Spironolactone    Get blood work    Refer to wound care    Record DAILY weights and blood pressure

## 2024-01-12 ENCOUNTER — PATIENT ROUNDING (BHMG ONLY) (OUTPATIENT)
Dept: CARDIOLOGY | Facility: CLINIC | Age: 59
End: 2024-01-12
Payer: COMMERCIAL

## 2024-01-12 NOTE — PROGRESS NOTES
[facility] Cardiology welcome email and rounding message has been sent to patient via [employer].

## 2024-01-16 ENCOUNTER — HOSPITAL ENCOUNTER (OUTPATIENT)
Dept: PHYSICAL THERAPY | Facility: HOSPITAL | Age: 59
Setting detail: THERAPIES SERIES
Discharge: HOME | End: 2024-01-16
Payer: COMMERCIAL

## 2024-01-16 DIAGNOSIS — R60.0 BILATERAL LOWER EXTREMITY EDEMA: Primary | ICD-10-CM

## 2024-01-16 DIAGNOSIS — S81.801A MULTIPLE OPEN WOUNDS OF LOWER LEG, RIGHT, INITIAL ENCOUNTER: ICD-10-CM

## 2024-01-16 DIAGNOSIS — S81.802A MULTIPLE OPEN WOUNDS OF LOWER LEG, LEFT, INITIAL ENCOUNTER: ICD-10-CM

## 2024-01-16 DIAGNOSIS — I87.2 VENOUS STASIS DERMATITIS OF BOTH LOWER EXTREMITIES: ICD-10-CM

## 2024-01-16 PROCEDURE — 29580 STRAPPING UNNA BOOT: CPT

## 2024-01-16 PROCEDURE — 97597 DBRDMT OPN WND 1ST 20 CM/<: CPT

## 2024-01-16 PROCEDURE — 97162 PT EVAL MOD COMPLEX 30 MIN: CPT

## 2024-01-16 NOTE — THERAPY EVALUATION
Outpatient Rehabilitation - Wound/Debridement Initial Eval  [facility]     Patient Name: Samara Stringer  : 1965  MRN: [id number]  Today's Date: 2024                  Admit Date: 2024    Visit Dx:    ICD-10-CM ICD-9-CM   1. Bilateral lower extremity edema  R60.0 782.3   2. Multiple open wounds of lower leg, left, initial encounter  S81.802A 894.0   3. Multiple open wounds of lower leg, right, initial encounter  S81.801A 894.0   4. Venous stasis dermatitis of both lower extremities  I87.2 454.1     BLE      L anterior leg      L lateral leg      L posterior leg      R anterior leg      R posterior leg        Patient Active Problem List   Diagnosis    Acute on chronic heart failure    Tobacco abuse    Pulmonary emphysema    Prediabetes    Bilateral leg edema        Past Medical History:   Diagnosis Date    COPD (chronic obstructive pulmonary disease)         Past Surgical History:   Procedure Laterality Date    LEG SURGERY Left     TUBAL ABDOMINAL LIGATION          Patient History       Row Name 24 1300             History    Chief Complaint Pain;Swelling;Ulcer, wound or other skin conditions  -      Type of Pain Lower Extremity / Leg  -      Date Current Problem(s) Began --  2023  -      Brief Description of Current Complaint Pt reports chronic BLE edema with episodes of exacerbation. Pt report she had considerable increase in BLE swelling along with weeping wounds beginning in 2023 which have failed to heal.  -      Previous treatment for THIS PROBLEM Medication  Diuretics  -      Patient/Caregiver Goals Relieve pain;Improve mobility;Decrease swelling;Heal wound  -      Patient/Caregiver Goals Comment Reduce pain and swelling, heal wounds  -      Current Tobacco Use Yes  -      Patient's Rating of General Health Good  -      Patient seeing anyone else for problem(s)? Dr. Turpin (cardiologist)  -      How has patient tried to help current  problem? Left wounds LUIS, used working hands lotion  -         Pain     Pain Location Leg  -LH      Pain at Present 0  -LH      Pain at Best 0  -LH      Pain at Worst 6  -LH         Services    Are you currently receiving Home Health services No  -LH      Do you plan to receive Home Health services in the near future No  -LH         Daily Activities    Primary Language English  -      How does patient learn best? Listening;Reading;Demonstration  -      Teaching needs identified Management of Condition;Other (comment)  wound/edema management  -      Barriers to learning None  -      Pt Participated in POC and Goals Yes  -                User Key  (r) = Recorded By, (t) = Taken By, (c) = Cosigned By      Initials Name Provider Type     Juan Diego Guardado, PT Physical Therapist                    EVALUATION   PT Ortho       Row Name 01/16/24 1300       Subjective    Subjective Comments Pt reports she has not been on any abx for these wounds. Reports she was taking 20mg of lasix BID however changed to 40mg once a day and it has helped with the swelling however the wounds are not healing.  -       Subjective Pain    Able to rate subjective pain? yes  -LH    Pre-Treatment Pain Level 2  -LH    Post-Treatment Pain Level 2  -LH       Transfers    Sit-Stand [place] (Transfers) independent  -LH    Stand-Sit [place] (Transfers) independent  -    Comment, (Transfers) Seated for tx  -       Gait/Stairs (Locomotion)    [place] Level (Gait) independent  -              User Key  (r) = Recorded By, (t) = Taken By, (c) = Cosigned By      Initials Name Provider Type     Juan Diego Guardado, PT Physical Therapist                   LDA Wound       Row Name 01/16/24 1300             Wound 01/16/24 Bilateral lower leg Venous Ulcer    Wound - Properties Group Placement Date: 01/16/24  - Present on Original Admission: Y  -LH Side: Bilateral  - Orientation: lower  -LH Location: leg  -LH Primary Wound  Type: Venous ulcer  -    Wound Image Images linked: 4  -LH      Dressing Appearance open to air  -      Base moist;pink;yellow;slough;scab;other (see comments)  Multiple scattered BLE open wounds, considerable debris and hairs  -      Periwound dry;redness;swelling;warm;edematous  -      Periwound Temperature warm  -      Periwound Skin Turgor soft  -      Edges irregular;open  -      Wound Length (cm) --  Multiple, scattered BLE open wounds  -      Drainage Characteristics/Odor serosanguineous;yellow  -      Drainage Amount small  -      Care, Wound cleansed with;soap and water;debrided;saskia boot  -      Dressing Care dressing applied;petroleum-based;gauze;abdominal pad  Xeroform to dry areas, Unna boot, ABD pads, cast padding  -      Periwound Care cleansed with pH balanced cleanser;dry periwound area maintained  -      Retired Wound - Properties Group Placement Date: 01/16/24  - Present on Original Admission: Y  - Side: Bilateral  - Orientation: lower  -LH Location: leg  - Primary Wound Type: Venous ulcer  -    Retired Wound - Properties Group Date first assessed: 01/16/24  - Present on Original Admission: Y  -LH Side: Bilateral  -LH Location: leg  - Primary Wound Type: Venous ulcer  -              User Key  (r) = Recorded By, (t) = Taken By, (c) = Cosigned By      Initials Name Provider Type     Juan Diego Guardado, PT Physical Therapist                   Lymphedema       Row Name 01/16/24 1300             Lymphedema Edema Assessment    Ptting Edema Category By severity  -      Pitting Edema Moderate  -         Skin Changes/Observations    Location/Assessment Lower Extremity  -      Lower Extremity Conditions bilateral:;dry;shiny;inflamed  -      Lower Extremity Color/Pigment bilateral:;red;blanchable;erythema;hyperpigmented  -         Lymphedema Pulses/Capillary Refill    Lymphedema Pulses/Capillary Refill lower extremity pulses;capillary refill  -       "Dorsalis Pedis Pulse right:;left:;+2 normal  -LH      Posterior Tibialis Pulse right:;left:;+2 normal  -LH      Capillary Refill lower extremity capillary refill  -      Lower Extremity Capillary Refill right:;left:;less than 3 seconds;other (comment)  sluggishly blanchable  -         Lymphedema Measurements    Measurement Type(s) Quick Girth  -      Quick Girth Areas Lower extremities  -         LLE Quick Girth (cm)    Mid foot 22.4 cm  -LH      Smallest ankle 25.2 cm  -      Largest calf 38 cm  -         RLE Quick Girth (cm)    Mid foot 22.7 cm  -LH      Smallest ankle 24.5 cm  -LH      Largest calf 41.1 cm  -      RLE Quick Girth Total 88.3  -         Compression/Skin Care    Compression/Skin Care skin care;wrapping location  -      Skin Care washed/dried;lotion applied  -      Wrapping Location lower extremity  -      Wrapping Location LE bilateral:;foot to knee  -      Wrapping Comments BLE Unna boots applied in clamshell pattern, ABD pad, cast padding, 4\" coban, size 5 spandage to secure.  -                User Key  (r) = Recorded By, (t) = Taken By, (c) = Cosigned By      Initials Name Provider Type     Juan Diego Guardado, PT Physical Therapist                    WOUND DEBRIDEMENT  Total area of Debridement: 6cm2  Debridement Site 1  Location- Site 1: BLE  Selective Debridement- Site 1: Wound Surface <20cmsq  Instruments- Site 1: tweezers, scissors  Excised Tissue Description- Site 1: moderate, slough, other (comment) (debris/hairs imbedded in wounds, crusts)  Bleeding- Site 1: none              Therapy Education       Row Name 01/16/24 1300             Therapy Education    Education Details Reviewed s/sx of infection and when to seek medical attention. Keep BLEs elevated as much as possible with UB intact until next session. Do not get UB wet. Reviewed importance of diuretics as prescribed.  -      Given Symptoms/condition management;Edema management;Bandaging/dressing change  - "      Program New  -      How Provided Verbal;Demonstration  -      Provided to Patient;Other (comment)  mother  -      Level of Understanding Teach back education performed;Verbalized  -                User Key  (r) = Recorded By, (t) = Taken By, (c) = Cosigned By      Initials Name Provider Type     Juan Diego Guardado, PT Physical Therapist                    Recommendation and Plan   PT Assessment/Plan       Row Name 24 1300          PT Assessment    Functional Limitations Performance in self-care ADL;Other (comment)  wound management  -     Impairments Edema;Impaired venous circulation;Pain;Integumentary integrity  -     Assessment Comments PT wound care initial evaulation complete. Pt presenting with chronic BLE edema with signs/symptoms consistent with chronic venous stasis and venous stasis dermatitis. Pt with multiple, scattered open ulcerations to the BLEs of various etiologies including venous ulcers and abrasions. Pt with moderate BLE erythema/inflammation and considerable debris/hairs imbedded in BLE wounds. PT debrided moderate amounts of necrotic tissue and debris from wounds to reduce bacterial load. PT applied BLE Unna boots for light compression to help promote venous return, improve skin integrity, reduce pain, and improve wound healing potential.  -     Rehab Potential Good  -     Patient/caregiver participated in establishment of treatment plan and goals Yes  -     Patient would benefit from skilled therapy intervention Yes  -        PT Plan    PT Frequency 2x/week  -     Predicted Duration of Therapy Intervention (PT) 12 visits  -     Planned CPT's? PT EVAL MOD COMPLELITY: 80193;PT JULIO CÉSAR DEBRIDE OPEN WOUND UP TO 20 CM: 47274;PT NONSELECT DEBRIDE 15 MIN: 43882;PT UNNA BOOT: 96723;PT MULTI LAYER COMP SYS LE  -     Physical Therapy Interventions (Optional Details) wound care;patient/family education  -     PT Plan Comments Debridement, dressings, Unna boot   -               User Key  (r) = Recorded By, (t) = Taken By, (c) = Cosigned By      Initials Name Provider Type     Juan Diego Guardado, PT Physical Therapist                      Goals   PT OP Goals       Row Name 01/16/24 1335 01/16/24 1300       PT Short Term Goals    STG 1 -- Verbalize s/sx of infection and when to seek medical attention.  -    STG 1 Progress -- New  -    STG 2 -- Demonstrate minimal remaining edema to improve skin integrity and wound healing potential.  -    STG 2 Progress -- New  -    STG 3 -- Decrease area of wounds by 50% as evidence of wound healing.  -    STG 3 Progress -- New  -       Long Term Goals    LTG 1 -- Demonstrate independence with home dressings/compression management.  -    LTG 1 Progress -- New  -    LTG 2 -- Decrease area of wounds by 90% as evidence of wound healing.  -    LTG 2 Progress -- New  -       Time Calculation    PT Goal Re-Cert Due Date 04/15/24  - 04/15/24  -              User Key  (r) = Recorded By, (t) = Taken By, (c) = Cosigned By      Initials Name Provider Type     Juan Diego Guardado, PT Physical Therapist                    Time Calculation: Start Time: 1300  Untimed Charges  PT Eval/Re-eval Minutes: 34  Wound Care: 79456 Selective debridement, 37239 Unna boot  09211-Tjtv Boot: 20  63755-Gknafqkbc debridement: 20  Total Minutes  Untimed Charges Total Minutes: 74   Total Minutes: 74  Therapy Charges for Today       Code Description Service Date Service Provider Modifiers Qty    92388345609 HC PT EVAL MOD COMPLEXITY 3 1/16/2024 Juan Diego Guardado, PT GP 1    06732500221 HC JULIO CÉSAR DEBRIDE OPEN WOUND UP TO 20CM 1/16/2024 Juan Diego Guardado, PT GP 1    26284957769 HC PT STAPPING UNNA BOOT 1/16/2024 Juan Diego Guardado, PT GP 1                  Juan Diego Guardado PT  1/16/2024

## 2024-01-19 ENCOUNTER — HOSPITAL ENCOUNTER (OUTPATIENT)
Dept: PHYSICAL THERAPY | Facility: HOSPITAL | Age: 59
Setting detail: THERAPIES SERIES
Discharge: HOME | End: 2024-01-19
Payer: COMMERCIAL

## 2024-01-19 ENCOUNTER — TELEPHONE (OUTPATIENT)
Dept: CARDIOLOGY | Facility: CLINIC | Age: 59
End: 2024-01-19

## 2024-01-19 DIAGNOSIS — I87.2 VENOUS STASIS DERMATITIS OF BOTH LOWER EXTREMITIES: ICD-10-CM

## 2024-01-19 DIAGNOSIS — R60.0 BILATERAL LOWER EXTREMITY EDEMA: Primary | ICD-10-CM

## 2024-01-19 DIAGNOSIS — S81.801A MULTIPLE OPEN WOUNDS OF LOWER LEG, RIGHT, INITIAL ENCOUNTER: ICD-10-CM

## 2024-01-19 DIAGNOSIS — S81.802A MULTIPLE OPEN WOUNDS OF LOWER LEG, LEFT, INITIAL ENCOUNTER: ICD-10-CM

## 2024-01-19 PROCEDURE — 29580 STRAPPING UNNA BOOT: CPT

## 2024-01-19 PROCEDURE — 97598 DBRDMT OPN WND ADDL 20CM/<: CPT

## 2024-01-19 PROCEDURE — 97597 DBRDMT OPN WND 1ST 20 CM/<: CPT

## 2024-01-19 RX ORDER — METOPROLOL SUCCINATE 50 MG/1
50 TABLET, EXTENDED RELEASE ORAL DAILY
Qty: 30 TABLET | Refills: 11 | Status: SHIPPED | OUTPATIENT
Start: 2024-01-19

## 2024-01-19 NOTE — TELEPHONE ENCOUNTER
Medication can be sent to [facility] here in [place]. Daughter states that patient has not had a stress test or heart cath. LVM with Dr. Wang's office to call office to verify.

## 2024-01-19 NOTE — THERAPY WOUND CARE TREATMENT
Outpatient Rehabilitation - Wound/Debridement Treatment Note  [facility]     Patient Name: Samara Stringer  : 1965  MRN: [id number]  Today's Date: 2024                 Admit Date: 2024    Visit Dx:    ICD-10-CM ICD-9-CM   1. Bilateral lower extremity edema  R60.0 782.3   2. Multiple open wounds of lower leg, left, initial encounter  S81.802A 894.0   3. Multiple open wounds of lower leg, right, initial encounter  S81.801A 894.0   4. Venous stasis dermatitis of both lower extremities  I87.2 454.1       Patient Active Problem List   Diagnosis    Acute on chronic heart failure    Tobacco abuse    Pulmonary emphysema    Prediabetes    Bilateral leg edema        Past Medical History:   Diagnosis Date    COPD (chronic obstructive pulmonary disease)         Past Surgical History:   Procedure Laterality Date    LEG SURGERY Left     TUBAL ABDOMINAL LIGATION           EVALUATION   PT Ortho       Row Name 24 1000       Subjective    Subjective Comments No complaints or changes.  -       Subjective Pain    Able to rate subjective pain? yes  -    Pre-Treatment Pain Level 2  -    Post-Treatment Pain Level 0  -    Subjective Pain Comment increased pain with debridement, but feels better overall  -       Transfers    Sit-Stand [place] (Transfers) independent  -MC    Stand-Sit [place] (Transfers) independent  -MC    Comment, (Transfers) seated for tx  -       Gait/Stairs (Locomotion)    [place] Level (Gait) independent  -              User Key  (r) = Recorded By, (t) = Taken By, (c) = Cosigned By      Initials Name Provider Type    Janina Conte PT Physical Therapist                     LDA Wound       Row Name 24 1000             Wound 24 Bilateral lower leg Venous Ulcer    Wound - Properties Group Placement Date: 24  -LH Present on Original Admission: Y  -LH Side: Bilateral  -LH Orientation: lower  -LH Location: leg  -LH Primary Wound Type:  Venous ulcer  -    Dressing Appearance intact;moist drainage;dried drainage  -      Base moist;pink;yellow;slough;scab;other (see comments)  intact, adherent scab to prox R leg. Three small open areas with slough to the base on the LLE.  -      Periwound dry;redness;swelling;warm;edematous  -      Periwound Temperature warm  -      Periwound Skin Turgor soft  -      Edges irregular;open  -      Drainage Characteristics/Odor serosanguineous;yellow  -      Drainage Amount small  -      Care, Wound cleansed with;wound cleanser;debrided;honey applied;saskia boot  -      Dressing Care dressing applied;petroleum-based;gauze;antimicrobial agent applied;foam  RLE: xeroform over remaining scab. LLE: honey/HFBt over open areas  -      Periwound Care cleansed with pH balanced cleanser;dry periwound area maintained  -      Retired Wound - Properties Group Placement Date: 01/16/24  - Present on Original Admission: Y  - Side: Bilateral  - Orientation: lower  - Location: leg  - Primary Wound Type: Venous ulcer  -    Retired Wound - Properties Group Date first assessed: 01/16/24  - Present on Original Admission: Y  - Side: Bilateral  - Location: leg  - Primary Wound Type: Venous ulcer  -              User Key  (r) = Recorded By, (t) = Taken By, (c) = Cosigned By      Initials Name Provider Type     Janina Jarvis, PT Physical Therapist     Juan Diego Guardado, PT Physical Therapist                   Lymphedema       Row Name 01/19/24 1000             Lymphedema Edema Assessment    Ptting Edema Category By severity  -      Pitting Edema Moderate  -      Edema Assessment Comment Improving  -         Skin Changes/Observations    Location/Assessment Lower Extremity  -      Lower Extremity Conditions bilateral:;dry;shiny;inflamed  -      Lower Extremity Color/Pigment bilateral:;red;blanchable;erythema;hyperpigmented  -         Lymphedema Pulses/Capillary Refill    Capillary  "Refill lower extremity capillary refill  -      Lower Extremity Capillary Refill right:;left:;less than 3 seconds;other (comment)  sluggishly blanchable  -         Lymphedema Measurements    Measurement Type(s) Quick Girth  -      Quick Girth Areas Lower extremities  -         Compression/Skin Care    Compression/Skin Care skin care;wrapping location  -      Skin Care washed/dried;lotion applied  -      Wrapping Location lower extremity  -      Wrapping Location LE bilateral:;foot to knee  -      Wrapping Comments BLE Unna boots applied in clamshell pattern, cast padding to LLE, 4\" coban, size 5 spandage to secure.  -                User Key  (r) = Recorded By, (t) = Taken By, (c) = Cosigned By      Initials Name Provider Type    Janina Conte, PT Physical Therapist                    WOUND DEBRIDEMENT  Total area of Debridement: 30 cm2  Debridement Site 1  Location- Site 1: BLE wounds and periwound areas  Selective Debridement- Site 1: Wound Surface >20cmsq  Instruments- Site 1: tweezers, scissors  Excised Tissue Description- Site 1: minimum, slough, maximum, other (comment) (crusting, adherent scabbing)  Bleeding- Site 1: none              Therapy Education       Row Name 01/19/24 1000             Therapy Education    Education Details Continue with unna boots. Explained change to honey/HFBt to the LLE open wounds.  -MC      Given Symptoms/condition management;Edema management;Bandaging/dressing change  -      Program Progressed  -      How Provided Verbal;Demonstration  -MC      Provided to Patient;Other (comment)  mother  -MC      Level of Understanding Verbalized  -                User Key  (r) = Recorded By, (t) = Taken By, (c) = Cosigned By      Initials Name Provider Type    Janina Conte PT Physical Therapist                    Recommendation and Plan   PT Assessment/Plan       Row Name 01/19/24 1000          PT Assessment    Functional Limitations Performance in " self-care ADL;Other (comment)  wound management  -     Impairments Edema;Impaired venous circulation;Pain;Integumentary integrity  -     Assessment Comments Pt already with notable improvement in overall BLE status since last assessment. Pt with edema reduction and wrinkling indicative of better fluid management. Pt with some adherent scabbing with limited debridement due to pain, but PT was able to debride large areas of crusting and some slough from the remaining areas on the LLE. Pt will continue to benefit from skilled PT wound care to promote healing.  -     Rehab Potential Good  -     Patient/caregiver participated in establishment of treatment plan and goals Yes  -     Patient would benefit from skilled therapy intervention Yes  -        PT Plan    PT Frequency 2x/week  -     Physical Therapy Interventions (Optional Details) wound care;patient/family education  -     PT Plan Comments debridement, dressings  -               User Key  (r) = Recorded By, (t) = Taken By, (c) = Cosigned By      Initials Name Provider Type    Janina Conte, PT Physical Therapist                    Goals   PT OP Goals       Row Name 01/19/24 1042          Time Calculation    PT Goal Re-Cert Due Date 04/15/24  -               User Key  (r) = Recorded By, (t) = Taken By, (c) = Cosigned By      Initials Name Provider Type     Janina Jarvis, PT Physical Therapist                    PT Goal Re-Cert Due Date: 04/15/24            Time Calculation: Start Time: 0945  Untimed Charges  Wound Care: 29535 Add't debridement ea 20 cm  07396-Huek Boot: 15  44185-Kwzdhmytf debridement: 10  50392-Pfo't debridement ea 20 cm: 10  Total Minutes  Untimed Charges Total Minutes: 35   Total Minutes: 35              Janina Jarvis, PT  1/19/2024

## 2024-01-19 NOTE — TELEPHONE ENCOUNTER
Caller: GLORIA NUNEZ  RelationshipCHILD    Best call back number: [phone]    What is the best time to reach you: ANYTIME    Who are you requesting to speak with (clinical staff, provider,  specific staff member): CLINICAL      What was the call regarding: PATIENTS DAUGHTER  GLORIA NUNEZ IS CALLING BACK FROM A MISSED CALL.  BH VERBAL IS NOT COMPLETE... PATIENT NOT AVAILABLE TO VERIFY.. PLEASE REACH BACK OUT TO PATIENTS DAUGTHER.    Is it okay if the provider responds through [employer]:

## 2024-01-24 ENCOUNTER — HOSPITAL ENCOUNTER (OUTPATIENT)
Dept: PHYSICAL THERAPY | Facility: HOSPITAL | Age: 59
Setting detail: THERAPIES SERIES
Discharge: HOME | End: 2024-01-24
Payer: COMMERCIAL

## 2024-01-24 DIAGNOSIS — I87.2 VENOUS STASIS DERMATITIS OF BOTH LOWER EXTREMITIES: ICD-10-CM

## 2024-01-24 DIAGNOSIS — S81.802A MULTIPLE OPEN WOUNDS OF LOWER LEG, LEFT, INITIAL ENCOUNTER: ICD-10-CM

## 2024-01-24 DIAGNOSIS — S81.801A MULTIPLE OPEN WOUNDS OF LOWER LEG, RIGHT, INITIAL ENCOUNTER: ICD-10-CM

## 2024-01-24 DIAGNOSIS — R60.0 BILATERAL LOWER EXTREMITY EDEMA: Primary | ICD-10-CM

## 2024-01-24 PROCEDURE — 97597 DBRDMT OPN WND 1ST 20 CM/<: CPT

## 2024-01-24 PROCEDURE — 29580 STRAPPING UNNA BOOT: CPT

## 2024-01-24 NOTE — THERAPY WOUND CARE TREATMENT
Outpatient Rehabilitation - Wound/Debridement Treatment Note  [facility]     Patient Name: Samara Stringer  : 1965  MRN: [id number]  Today's Date: 2024                 Admit Date: 2024    Visit Dx:    ICD-10-CM ICD-9-CM   1. Bilateral lower extremity edema  R60.0 782.3   2. Multiple open wounds of lower leg, left, initial encounter  S81.802A 894.0   3. Multiple open wounds of lower leg, right, initial encounter  S81.801A 894.0   4. Venous stasis dermatitis of both lower extremities  I87.2 454.1                 Patient Active Problem List   Diagnosis    Acute on chronic heart failure    Tobacco abuse    Pulmonary emphysema    Prediabetes    Bilateral leg edema        Past Medical History:   Diagnosis Date    COPD (chronic obstructive pulmonary disease)         Past Surgical History:   Procedure Laterality Date    LEG SURGERY Left     TUBAL ABDOMINAL LIGATION           EVALUATION   PT Ortho       Row Name 24 0900       Subjective    Subjective Comments Pt with no compaints, mild pain with debridement,  -MF       Subjective Pain    Able to rate subjective pain? yes  -MF    Pre-Treatment Pain Level 0  -    Post-Treatment Pain Level 0  -    Subjective Pain Comment 2-3 /10 with debridement.  -MF       Transfers    Sit-Stand [place] (Transfers) independent  -MF    Stand-Sit [place] (Transfers) independent  -    Comment, (Transfers) seated for tx  -       Gait/Stairs (Locomotion)    [place] Level (Gait) independent  -              User Key  (r) = Recorded By, (t) = Taken By, (c) = Cosigned By      Initials Name Provider Type    Delgado Giang, PT Physical Therapist                     [facility] Wound       Row Name 24 0900             Wound 24 Bilateral lower leg Venous Ulcer    Wound - Properties Group Placement Date: 24  - Present on Original Admission: Y  -LH Side: Bilateral  -LH Orientation: lower  -LH Location: leg  -LH Primary Wound Type:  "Venous ulcer  -    Wound Image Images linked: 3  -MF      Dressing Appearance intact;moist drainage  -      Base moist;pink;red;slough  -      Periwound dry;swelling;edematous  -      Periwound Temperature warm  -      Periwound Skin Turgor soft  -      Edges irregular;open  -      Drainage Characteristics/Odor serosanguineous  -      Drainage Amount small  -      Care, Wound cleansed with;soap and water;debrided  -      Dressing Care foam;low-adherent  therahoney with HFBt  -      Periwound Care cleansed with pH balanced cleanser  -      Retired Wound - Properties Group Placement Date: 01/16/24  - Present on Original Admission: Y  - Side: Bilateral  - Orientation: lower  - Location: leg  - Primary Wound Type: Venous ulcer  -    Retired Wound - Properties Group Date first assessed: 01/16/24  - Present on Original Admission: Y  - Side: Bilateral  - Location: leg  - Primary Wound Type: Venous ulcer  -              User Key  (r) = Recorded By, (t) = Taken By, (c) = Cosigned By      Initials Name Provider Type    Delgado Giang, PT Physical Therapist    LH Juan Diego Guardado, PT Physical Therapist                   Lymphedema       Row Name 01/24/24 0900             Lymphedema Edema Assessment    Ptting Edema Category By severity  -      Pitting Edema Mild  -         Compression/Skin Care    Compression/Skin Care skin care;wrapping location  -      Skin Care washed/dried;lotion applied  -      Wrapping Location lower extremity  -      Wrapping Location LE bilateral:;foot to knee  -      Wrapping Comments BLE Unna boots applied in clamshell pattern, 4\" coban, size 5 spandage to secure.  -                User Key  (r) = Recorded By, (t) = Taken By, (c) = Cosigned By      Initials Name Provider Type    Delgado Giang, PT Physical Therapist                    WOUND DEBRIDEMENT  Total area of Debridement: ~10cm2  Debridement Site 1  Location- Site 1: BLE " wounds and periwound areas  Selective Debridement- Site 1: Wound Surface <20cmsq  Instruments- Site 1: tweezers, scissors  Excised Tissue Description- Site 1: minimum, slough, other (comment) (periwound crust / nonviable skin)  Bleeding- Site 1: none              Therapy Education       Row Name 01/24/24 0900             Therapy Education    Education Details Pt to cont with LE elevation and keep unna boots dry and intact until next visit.  -MF      Given Symptoms/condition management;Edema management;Bandaging/dressing change  -      Program Progressed  -      How Provided Verbal;Demonstration  -MF      Provided to Patient;Other (comment)  -      Level of Understanding Verbalized  -                User Key  (r) = Recorded By, (t) = Taken By, (c) = Cosigned By      Initials Name Provider Type    Delgado Giang, PT Physical Therapist                    Recommendation and Plan   PT Assessment/Plan       Row Name 01/24/24 0900          PT Assessment    Functional Limitations Performance in self-care ADL;Other (comment)  -     Impairments Edema;Impaired venous circulation;Pain;Integumentary integrity  -     Assessment Comments BLE Edema decreasing well with pt reporting ~40 lb weight loss with increased diuretic.  PT will cont with unna boots and debridement to help further reduce LE Edema and improve skin integrity.  -     Rehab Potential Good  -     Patient/caregiver participated in establishment of treatment plan and goals Yes  -     Patient would benefit from skilled therapy intervention Yes  -MF        PT Plan    PT Frequency 2x/week  -     Physical Therapy Interventions (Optional Details) wound care;patient/family education  -     PT Plan Comments cont with debridement and unna boot 2x/week  -               User Key  (r) = Recorded By, (t) = Taken By, (c) = Cosigned By      Initials Name Provider Type    Delgado Giang, PT Physical Therapist                    Goals   PT OP  Goals       Row Name 01/24/24 0900          Time Calculation    PT Goal Re-Cert Due Date 04/15/24  -               User Key  (r) = Recorded By, (t) = Taken By, (c) = Cosigned By      Initials Name Provider Type    Delagdo Giang, PT Physical Therapist                    PT Goal Re-Cert Due Date: 04/15/24            Time Calculation: Start Time: 0900  Untimed Charges  48180-Bsth Boot: 15  74995-Mtdzqndhd debridement: 15  Total Minutes  Untimed Charges Total Minutes: 30   Total Minutes: 30              Delgado Cunningham, PT  1/24/2024

## 2024-01-26 ENCOUNTER — HOSPITAL ENCOUNTER (OUTPATIENT)
Dept: PHYSICAL THERAPY | Facility: HOSPITAL | Age: 59
Setting detail: THERAPIES SERIES
Discharge: HOME | End: 2024-01-26
Payer: COMMERCIAL

## 2024-01-26 ENCOUNTER — OFFICE VISIT (OUTPATIENT)
Dept: CARDIOLOGY | Facility: CLINIC | Age: 59
End: 2024-01-26
Payer: COMMERCIAL

## 2024-01-26 VITALS
DIASTOLIC BLOOD PRESSURE: 70 MMHG | OXYGEN SATURATION: 92 % | BODY MASS INDEX: 26.36 KG/M2 | HEIGHT: 66 IN | SYSTOLIC BLOOD PRESSURE: 120 MMHG | HEART RATE: 75 BPM | WEIGHT: 164 LBS

## 2024-01-26 DIAGNOSIS — S81.801A MULTIPLE OPEN WOUNDS OF LOWER LEG, RIGHT, INITIAL ENCOUNTER: ICD-10-CM

## 2024-01-26 DIAGNOSIS — Z72.0 TOBACCO ABUSE: Chronic | ICD-10-CM

## 2024-01-26 DIAGNOSIS — I50.22 CHRONIC HFREF (HEART FAILURE WITH REDUCED EJECTION FRACTION): Primary | Chronic | ICD-10-CM

## 2024-01-26 DIAGNOSIS — R60.0 BILATERAL LOWER EXTREMITY EDEMA: Primary | ICD-10-CM

## 2024-01-26 DIAGNOSIS — S81.802A MULTIPLE OPEN WOUNDS OF LOWER LEG, LEFT, INITIAL ENCOUNTER: ICD-10-CM

## 2024-01-26 DIAGNOSIS — R73.03 PREDIABETES: Chronic | ICD-10-CM

## 2024-01-26 DIAGNOSIS — I42.9 CARDIOMYOPATHY, UNSPECIFIED TYPE: Chronic | ICD-10-CM

## 2024-01-26 DIAGNOSIS — R79.89 ELEVATED TROPONIN: ICD-10-CM

## 2024-01-26 DIAGNOSIS — I87.2 VENOUS STASIS DERMATITIS OF BOTH LOWER EXTREMITIES: ICD-10-CM

## 2024-01-26 PROCEDURE — 29580 STRAPPING UNNA BOOT: CPT

## 2024-01-26 PROCEDURE — 97597 DBRDMT OPN WND 1ST 20 CM/<: CPT

## 2024-01-26 RX ORDER — METOPROLOL SUCCINATE 100 MG/1
100 TABLET, EXTENDED RELEASE ORAL DAILY
Qty: 30 TABLET | Refills: 3 | Status: SHIPPED | OUTPATIENT
Start: 2024-01-26

## 2024-01-26 NOTE — H&P (VIEW-ONLY)
[facility] Cardiology OP Consult Note    Samara Stringer  [id number]  01/10/2024    Referred By: No ref. provider found    Chief Complaint   Patient presents with    Follow-up    Congestive Heart Failure       Subjective     History of Present Illness:   Samara Stringer is a 58 y.o. female chronic HFrEF, prediabetes, and tobacco abuse who presents for follow-up for heart failure.  During her last visit, we ordered an echocardiogram and found that her EF is around 30%.  We were able to compare this with echo from Dr. Wang's office from , at which time EF was around 39%.  Patient says that she stopped going to see cardiology for personal reasons and never completed workup for heart failure at that time.  During her last visit, we also advised that she increase her Lasix to twice daily and also added metoprolol.  She says that her leg swelling has significantly improved.  She has lost about 24 pounds of water weight since her last visit.  She was also seen by wound care and is doing much better.  She can now cross her legs.  Can breathe normally with minimal exertion.  Does have occasional chest pains with activity.    Past Cardiac Testin. Last Coronary Angio: none  2. Last Echo: 2024    Left ventricular systolic function is moderately decreased. Calculated left ventricular 3D EF = 31% Left ventricular ejection fraction appears to be 31 - 35%.    The left ventricular cavity is moderately dilated. Left ventricular wall thickness is consistent with mild concentric hypertrophy.    Grade II diastolic dysfunction.    The right ventricular cavity is mildly dilated.  Borderline right ventricular systolic function.    Moderate biatrial dilation.    Moderate mitral valve regurgitation is present, eccentric and directed posteriorly.  Eccentricity of the regurgitant jet may underestimate severity of regurgitation.    Moderate to severe tricuspid valve regurgitation is present.    Estimated  right ventricular systolic pressure from tricuspid regurgitation is moderately elevated (45-55 mmHg). Calculated right ventricular systolic pressure from tricuspid regurgitation is 49 mmHg.    There is a trivial circumferential pericardial effusion.   3. Prior Stress Testing: None  4. Prior Holter Monitor: None    No specialty comments available.    Review of Systems   Constitutional: Negative.    HENT: Negative.     Respiratory:  Positive for chest tightness and shortness of breath.    Cardiovascular:  Positive for chest pain and leg swelling. Negative for palpitations.   Musculoskeletal: Negative.    Neurological: Negative.        Past Medical History:   Diagnosis Date    COPD (chronic obstructive pulmonary disease)        Past Surgical History:   Procedure Laterality Date    LEG SURGERY Left 1998    TUBAL ABDOMINAL LIGATION         History reviewed. No pertinent family history.    Social History     Socioeconomic History    Marital status:    Tobacco Use    Smoking status: Every Day     Packs/day: .5     Types: Cigarettes     Passive exposure: Current    Smokeless tobacco: Never   Vaping Use    Vaping Use: Some days    Substances: Nicotine, Flavoring    Devices: Disposable   Substance and Sexual Activity    Alcohol use: Never    Drug use: Not Currently     Comment: neurontin; marijuana    Sexual activity: Defer         Current Outpatient Medications:     albuterol (ACCUNEB) 0.63 MG/3ML nebulizer solution, Take 3 mL by nebulization Every 6 (Six) Hours As Needed for Wheezing., Disp: 20 each, Rfl: 0    albuterol sulfate  (90 Base) MCG/ACT inhaler, Inhale 2 puffs Every 4 (Four) Hours As Needed for Wheezing., Disp: 17 g, Rfl: 0    aspirin 81 MG EC tablet, Take 1 tablet by mouth Daily for 90 days., Disp: 30 tablet, Rfl: 2    budesonide-formoterol (SYMBICORT) 160-4.5 MCG/ACT inhaler, Inhale 2 puffs 2 (Two) Times a Day. Pt takes prn, Disp: , Rfl:     dapagliflozin Propanediol (Farxiga) 10 MG tablet, Take 10  "mg by mouth Daily for 120 days., Disp: 30 tablet, Rfl: 3    escitalopram (LEXAPRO) 10 MG tablet, Take 1 tablet by mouth Daily., Disp: , Rfl:     furosemide (LASIX) 40 MG tablet, Take 1 tablet by mouth Daily. Pt taking twice a day, Disp: , Rfl:     meloxicam (MOBIC) 7.5 MG tablet, Take 1 tablet by mouth Daily., Disp: 14 tablet, Rfl: 0    metoprolol succinate XL (TOPROL-XL) 100 MG 24 hr tablet, Take 1 tablet by mouth Daily., Disp: 30 tablet, Rfl: 3    rosuvastatin (CRESTOR) 10 MG tablet, Take 1 tablet by mouth Daily., Disp: , Rfl:     sacubitril-valsartan (Entresto)  MG tablet, Take 1 tablet by mouth 2 (Two) Times a Day., Disp: , Rfl:     spironolactone (ALDACTONE) 25 MG tablet, Take 1 tablet by mouth Daily., Disp: , Rfl:     doxycycline (MONODOX) 100 MG capsule, Take 1 capsule by mouth 2 (Two) Times a Day. (Patient not taking: Reported on 1/26/2024), Disp: 20 capsule, Rfl: 0    predniSONE (DELTASONE) 20 MG tablet, Take 1 tablet by mouth 2 (Two) Times a Day. (Patient not taking: Reported on 1/26/2024), Disp: 10 tablet, Rfl: 0    Allergies   Allergen Reactions    Amoxicillin Hives    Codeine Swelling          Objective   Vitals:    01/26/24 1043   BP: 120/70   BP Location: Left arm   Patient Position: Sitting   Cuff Size: Adult   Pulse: 75   SpO2: 92%   Weight: 74.4 kg (164 lb)   Height: 167.6 cm (66\")       Vitals reviewed.   Constitutional:       Appearance: Healthy appearance. Not in distress.   Neck:      Vascular: JVD present.   Pulmonary:      Effort: Pulmonary effort is normal.      Breath sounds: Normal breath sounds.   Cardiovascular:      Normal rate. Regular rhythm. Normal S1. Normal S2.       Murmurs: There is no murmur.      No gallop.  No click. No rub.   Pulses:     Intact distal pulses.   Edema:     Pretibial: bilateral 1+ edema of the pretibial area.     Ankle: bilateral 1+ edema of the ankle.     Feet: bilateral 1+ edema of the feet.  Abdominal:      General: There is no distension.      " Palpations: Abdomen is soft.      Tenderness: There is no abdominal tenderness.   Skin:     General: Skin is warm and dry.         Results Review:   I reviewed the patient's new clinical results.  I reviewed the patient's other test results and agree with the interpretation  I personally viewed and interpreted the patient's EKG/Telemetry data    Procedures       Assessment / Plan:   Diagnoses and all orders for this visit:    1. Chronic HFrEF (heart failure with reduced ejection fraction) (Primary)  -     metoprolol succinate XL (TOPROL-XL) 100 MG 24 hr tablet; Take 1 tablet by mouth Daily.  Dispense: 30 tablet; Refill: 3  EF is around 30% with global hypokinesis.  Grade II diastolic dysfunction with moderate to severe valvular regurgitation.  RVSP around 50.  LV is also dilated, suggesting this is a chronic and potentially irreversible process.  Clinically, doing much better.  More euvolemic today.  Only very mild JVD present.  Blood pressure and heart rate are also improved.  Dry weight around 164.  -- Increase metoprolol succinate to 100 mg daily  -- Continue Lasix 40 mg daily with second dose as needed based on weight and symptoms  -- Continue Entresto, spironolactone, and Farxiga  -- Advised discontinuation of all NSAIDs    2. Cardiomyopathy, unspecified type  -     Case Request Cath Lab: Coronary angiography  3. Elevated troponin  We were able to contact Dr. Wang's office today and confirmed that she has never had an ischemic evaluation -no stress test or caths.  Has had elevated troponins in the ER recently.  Does have some exertional chest tightness.  With her EF worsening from 39% to 30%, I do think exclusion of obstructive CAD is necessary.  -- Discussed risks and benefits of coronary angiography and possible PCI and patient is willing to proceed.  Okay with DAPT if necessary  -- Continue aspirin and statin    4. Prediabetes  Labs are still pending.  Patient will be establishing with primary care to take  care of this and her COPD.    5. Tobacco abuse  Again encouraged her to pursue complete smoking cessation.         Preventative Cardiology:   Tobacco Cessation: Cessation Counseling Provided   Obstructive Sleep Apnea Screening: Deffered  AAA Screening: Deffered  Peripheral Arterial Disease Screening: Completed    Follow Up:   Return in about 4 weeks (around 2/23/2024).    Thank you for allowing me to participate in the care of your patient. Please to not hesitate to contact me with additional questions or concerns.     I spent over 40 minutes evaluating, treating, counseling, coordinating patient care, reviewing old records, and documenting.    Jack Turpin MD  01/10/2024  09:48 EST

## 2024-01-26 NOTE — THERAPY WOUND CARE TREATMENT
Outpatient Rehabilitation - Wound/Debridement Treatment Note  [facility]     Patient Name: Samara Stringer  : 1965  MRN: [id number]  Today's Date: 2024                 Admit Date: 2024    Visit Dx:    ICD-10-CM ICD-9-CM   1. Bilateral lower extremity edema  R60.0 782.3   2. Multiple open wounds of lower leg, left, initial encounter  S81.802A 894.0   3. Multiple open wounds of lower leg, right, initial encounter  S81.801A 894.0   4. Venous stasis dermatitis of both lower extremities  I87.2 454.1       Patient Active Problem List   Diagnosis    Acute on chronic heart failure    Tobacco abuse    Pulmonary emphysema    Prediabetes    Bilateral leg edema    Chronic HFrEF (heart failure with reduced ejection fraction)    Cardiomyopathy        Past Medical History:   Diagnosis Date    COPD (chronic obstructive pulmonary disease)         Past Surgical History:   Procedure Laterality Date    LEG SURGERY Left     TUBAL ABDOMINAL LIGATION           EVALUATION   PT Ortho       Row Name 24 1400       Subjective    Subjective Comments Pt would really like to shower if possible. The unna boots are helping with the itching.  -       Subjective Pain    Able to rate subjective pain? yes  -    Pre-Treatment Pain Level 0  -MC    Post-Treatment Pain Level 0  -MC       Transfers    Sit-Stand [place] (Transfers) independent  -MC    Stand-Sit [place] (Transfers) independent  -MC    Comment, (Transfers) seated for tx  -       Gait/Stairs (Locomotion)    [place] Level (Gait) independent  -              User Key  (r) = Recorded By, (t) = Taken By, (c) = Cosigned By      Initials Name Provider Type    Janina Conte PT Physical Therapist                     LDA Wound       Row Name 24 1400             Wound 24 Bilateral lower leg Venous Ulcer    Wound - Properties Group Placement Date: 24  -LH Present on Original Admission: Y  -LH Side: Bilateral  -LH  Orientation: lower  - Location: leg  - Primary Wound Type: Venous ulcer  -    Dressing Appearance intact;moist drainage  -      Base pink;red;slough;moist;dry  all areas slightly dry today  -      Periwound dry;swelling;edematous  -      Periwound Temperature warm  -      Periwound Skin Turgor soft  -      Edges irregular;open  -      Drainage Characteristics/Odor serosanguineous  -      Drainage Amount small  -      Care, Wound cleansed with;wound cleanser;debrided;saskia boot  -      Dressing Care dressing applied;petroleum-based;gauze  xeroform, unna boot  -      Periwound Care cleansed with pH balanced cleanser;dry periwound area maintained  -      Retired Wound - Properties Group Placement Date: 01/16/24  - Present on Original Admission: Y  - Side: Bilateral  - Orientation: lower  - Location: leg  - Primary Wound Type: Venous ulcer  -    Retired Wound - Properties Group Date first assessed: 01/16/24  - Present on Original Admission: Y  - Side: Bilateral  - Location: leg  - Primary Wound Type: Venous ulcer  -              User Key  (r) = Recorded By, (t) = Taken By, (c) = Cosigned By      Initials Name Provider Type     Janina Jarvis, PT Physical Therapist     Juan Diego Guardado, PT Physical Therapist                   Lymphedema       Row Name 01/26/24 1400             Lymphedema Edema Assessment    Ptting Edema Category By severity  -      Pitting Edema Mild  -         Skin Changes/Observations    Lower Extremity Conditions bilateral:;dry;shiny;inflamed  -      Lower Extremity Color/Pigment bilateral:;red;blanchable;erythema;hyperpigmented  -         Lymphedema Pulses/Capillary Refill    Lower Extremity Capillary Refill right:;left:;less than 3 seconds  -         Compression/Skin Care    Compression/Skin Care skin care;wrapping location  -      Skin Care washed/dried;lotion applied  -      Wrapping Location lower extremity  -      Wrapping  "Location LE bilateral:;foot to knee  -      Wrapping Comments xeroform over all open areas, unna boots in clamshell application, 4\" coban, size 5 spandage  -                User Key  (r) = Recorded By, (t) = Taken By, (c) = Cosigned By      Initials Name Provider Type    Janina Conte, PT Physical Therapist                    WOUND DEBRIDEMENT  Total area of Debridement: 8 cm2  Debridement Site 1  Location- Site 1: BLE wounds and periwound areas  Selective Debridement- Site 1: Wound Surface <20cmsq  Instruments- Site 1: tweezers  Excised Tissue Description- Site 1: minimum, slough, other (comment) (crust)  Bleeding- Site 1: none              Therapy Education       Row Name 01/26/24 1400             Therapy Education    Education Details Explained use of xeroform over all open areas while the areas are somewhat dry. You may remove the unna boots soon before your next appt and shower, presenting immediately here for assessment after that.  -      Given Symptoms/condition management;Edema management;Bandaging/dressing change  -      Program Progressed  -      How Provided Verbal;Demonstration  -      Provided to Patient;Other (comment)  -      Level of Understanding Verbalized  -                User Key  (r) = Recorded By, (t) = Taken By, (c) = Cosigned By      Initials Name Provider Type    Janina Conte PT Physical Therapist                    Recommendation and Plan   PT Assessment/Plan       Row Name 01/26/24 1400          PT Assessment    Functional Limitations Performance in self-care ADL;Other (comment)  -     Impairments Edema;Impaired venous circulation;Pain;Integumentary integrity  -     Assessment Comments Pt with some yellow slough remaining, but all areas are slightly dry today. PT changed dressings to xeroform under the unna boot to promote better moisture balance. Pt will continue to benefit from skilled PT wound care to promote healing.  -     Rehab Potential Good "  -     Patient/caregiver participated in establishment of treatment plan and goals Yes  -     Patient would benefit from skilled therapy intervention Yes  -        PT Plan    PT Frequency 2x/week  -     Physical Therapy Interventions (Optional Details) wound care;patient/family education  -     PT Plan Comments debridement, dressings, UB  -               User Key  (r) = Recorded By, (t) = Taken By, (c) = Cosigned By      Initials Name Provider Type    Janina Conte, PT Physical Therapist                    Goals   PT OP Goals       Row Name 01/26/24 1418          Time Calculation    PT Goal Re-Cert Due Date 04/15/24  -               User Key  (r) = Recorded By, (t) = Taken By, (c) = Cosigned By      Initials Name Provider Type    Janina Conte, PT Physical Therapist                    PT Goal Re-Cert Due Date: 04/15/24            Time Calculation: Start Time: 1345  Untimed Charges  73858-Fsuu Boot: 15  27600-Spgotanuw debridement: 15  Total Minutes  Untimed Charges Total Minutes: 30   Total Minutes: 30              Janina Jarvis, PT  1/26/2024

## 2024-01-26 NOTE — PATIENT INSTRUCTIONS
Target weight of 164 lb. Weight yourself DAILY - same time of day, same amount of clothing.     Keep a log of weights and blood pressures.    Take Furosemide ONCE DAILY    Take extra diuretic (furosemide) after lunch for weight gain of 3-5 lb over 24 hrs or worsening shortness of breath/leg swelling.     INCREASE metoprolol to 100 mg daily    HEART CATH next week    Get blood work done

## 2024-01-26 NOTE — PROGRESS NOTES
[facility] Cardiology OP Consult Note    Samara Stringer  [id number]  01/10/2024    Referred By: No ref. provider found    Chief Complaint   Patient presents with    Follow-up    Congestive Heart Failure       Subjective     History of Present Illness:   Samara Stringer is a 58 y.o. female chronic HFrEF, prediabetes, and tobacco abuse who presents for follow-up for heart failure.  During her last visit, we ordered an echocardiogram and found that her EF is around 30%.  We were able to compare this with echo from Dr. Wang's office from , at which time EF was around 39%.  Patient says that she stopped going to see cardiology for personal reasons and never completed workup for heart failure at that time.  During her last visit, we also advised that she increase her Lasix to twice daily and also added metoprolol.  She says that her leg swelling has significantly improved.  She has lost about 24 pounds of water weight since her last visit.  She was also seen by wound care and is doing much better.  She can now cross her legs.  Can breathe normally with minimal exertion.  Does have occasional chest pains with activity.    Past Cardiac Testin. Last Coronary Angio: none  2. Last Echo: 2024    Left ventricular systolic function is moderately decreased. Calculated left ventricular 3D EF = 31% Left ventricular ejection fraction appears to be 31 - 35%.    The left ventricular cavity is moderately dilated. Left ventricular wall thickness is consistent with mild concentric hypertrophy.    Grade II diastolic dysfunction.    The right ventricular cavity is mildly dilated.  Borderline right ventricular systolic function.    Moderate biatrial dilation.    Moderate mitral valve regurgitation is present, eccentric and directed posteriorly.  Eccentricity of the regurgitant jet may underestimate severity of regurgitation.    Moderate to severe tricuspid valve regurgitation is present.    Estimated  right ventricular systolic pressure from tricuspid regurgitation is moderately elevated (45-55 mmHg). Calculated right ventricular systolic pressure from tricuspid regurgitation is 49 mmHg.    There is a trivial circumferential pericardial effusion.   3. Prior Stress Testing: None  4. Prior Holter Monitor: None    No specialty comments available.    Review of Systems   Constitutional: Negative.    HENT: Negative.     Respiratory:  Positive for chest tightness and shortness of breath.    Cardiovascular:  Positive for chest pain and leg swelling. Negative for palpitations.   Musculoskeletal: Negative.    Neurological: Negative.        Past Medical History:   Diagnosis Date    COPD (chronic obstructive pulmonary disease)        Past Surgical History:   Procedure Laterality Date    LEG SURGERY Left 1998    TUBAL ABDOMINAL LIGATION         History reviewed. No pertinent family history.    Social History     Socioeconomic History    Marital status:    Tobacco Use    Smoking status: Every Day     Packs/day: .5     Types: Cigarettes     Passive exposure: Current    Smokeless tobacco: Never   Vaping Use    Vaping Use: Some days    Substances: Nicotine, Flavoring    Devices: Disposable   Substance and Sexual Activity    Alcohol use: Never    Drug use: Not Currently     Comment: neurontin; marijuana    Sexual activity: Defer         Current Outpatient Medications:     albuterol (ACCUNEB) 0.63 MG/3ML nebulizer solution, Take 3 mL by nebulization Every 6 (Six) Hours As Needed for Wheezing., Disp: 20 each, Rfl: 0    albuterol sulfate  (90 Base) MCG/ACT inhaler, Inhale 2 puffs Every 4 (Four) Hours As Needed for Wheezing., Disp: 17 g, Rfl: 0    aspirin 81 MG EC tablet, Take 1 tablet by mouth Daily for 90 days., Disp: 30 tablet, Rfl: 2    budesonide-formoterol (SYMBICORT) 160-4.5 MCG/ACT inhaler, Inhale 2 puffs 2 (Two) Times a Day. Pt takes prn, Disp: , Rfl:     dapagliflozin Propanediol (Farxiga) 10 MG tablet, Take 10  "mg by mouth Daily for 120 days., Disp: 30 tablet, Rfl: 3    escitalopram (LEXAPRO) 10 MG tablet, Take 1 tablet by mouth Daily., Disp: , Rfl:     furosemide (LASIX) 40 MG tablet, Take 1 tablet by mouth Daily. Pt taking twice a day, Disp: , Rfl:     meloxicam (MOBIC) 7.5 MG tablet, Take 1 tablet by mouth Daily., Disp: 14 tablet, Rfl: 0    metoprolol succinate XL (TOPROL-XL) 100 MG 24 hr tablet, Take 1 tablet by mouth Daily., Disp: 30 tablet, Rfl: 3    rosuvastatin (CRESTOR) 10 MG tablet, Take 1 tablet by mouth Daily., Disp: , Rfl:     sacubitril-valsartan (Entresto)  MG tablet, Take 1 tablet by mouth 2 (Two) Times a Day., Disp: , Rfl:     spironolactone (ALDACTONE) 25 MG tablet, Take 1 tablet by mouth Daily., Disp: , Rfl:     doxycycline (MONODOX) 100 MG capsule, Take 1 capsule by mouth 2 (Two) Times a Day. (Patient not taking: Reported on 1/26/2024), Disp: 20 capsule, Rfl: 0    predniSONE (DELTASONE) 20 MG tablet, Take 1 tablet by mouth 2 (Two) Times a Day. (Patient not taking: Reported on 1/26/2024), Disp: 10 tablet, Rfl: 0    Allergies   Allergen Reactions    Amoxicillin Hives    Codeine Swelling          Objective   Vitals:    01/26/24 1043   BP: 120/70   BP Location: Left arm   Patient Position: Sitting   Cuff Size: Adult   Pulse: 75   SpO2: 92%   Weight: 74.4 kg (164 lb)   Height: 167.6 cm (66\")       Vitals reviewed.   Constitutional:       Appearance: Healthy appearance. Not in distress.   Neck:      Vascular: JVD present.   Pulmonary:      Effort: Pulmonary effort is normal.      Breath sounds: Normal breath sounds.   Cardiovascular:      Normal rate. Regular rhythm. Normal S1. Normal S2.       Murmurs: There is no murmur.      No gallop.  No click. No rub.   Pulses:     Intact distal pulses.   Edema:     Pretibial: bilateral 1+ edema of the pretibial area.     Ankle: bilateral 1+ edema of the ankle.     Feet: bilateral 1+ edema of the feet.  Abdominal:      General: There is no distension.      " Palpations: Abdomen is soft.      Tenderness: There is no abdominal tenderness.   Skin:     General: Skin is warm and dry.         Results Review:   I reviewed the patient's new clinical results.  I reviewed the patient's other test results and agree with the interpretation  I personally viewed and interpreted the patient's EKG/Telemetry data    Procedures       Assessment / Plan:   Diagnoses and all orders for this visit:    1. Chronic HFrEF (heart failure with reduced ejection fraction) (Primary)  -     metoprolol succinate XL (TOPROL-XL) 100 MG 24 hr tablet; Take 1 tablet by mouth Daily.  Dispense: 30 tablet; Refill: 3  EF is around 30% with global hypokinesis.  Grade II diastolic dysfunction with moderate to severe valvular regurgitation.  RVSP around 50.  LV is also dilated, suggesting this is a chronic and potentially irreversible process.  Clinically, doing much better.  More euvolemic today.  Only very mild JVD present.  Blood pressure and heart rate are also improved.  Dry weight around 164.  -- Increase metoprolol succinate to 100 mg daily  -- Continue Lasix 40 mg daily with second dose as needed based on weight and symptoms  -- Continue Entresto, spironolactone, and Farxiga  -- Advised discontinuation of all NSAIDs    2. Cardiomyopathy, unspecified type  -     Case Request Cath Lab: Coronary angiography  3. Elevated troponin  We were able to contact Dr. Wang's office today and confirmed that she has never had an ischemic evaluation -no stress test or caths.  Has had elevated troponins in the ER recently.  Does have some exertional chest tightness.  With her EF worsening from 39% to 30%, I do think exclusion of obstructive CAD is necessary.  -- Discussed risks and benefits of coronary angiography and possible PCI and patient is willing to proceed.  Okay with DAPT if necessary  -- Continue aspirin and statin    4. Prediabetes  Labs are still pending.  Patient will be establishing with primary care to take  care of this and her COPD.    5. Tobacco abuse  Again encouraged her to pursue complete smoking cessation.         Preventative Cardiology:   Tobacco Cessation: Cessation Counseling Provided   Obstructive Sleep Apnea Screening: Deffered  AAA Screening: Deffered  Peripheral Arterial Disease Screening: Completed    Follow Up:   Return in about 4 weeks (around 2/23/2024).    Thank you for allowing me to participate in the care of your patient. Please to not hesitate to contact me with additional questions or concerns.     I spent over 40 minutes evaluating, treating, counseling, coordinating patient care, reviewing old records, and documenting.    Jack Turpin MD  01/10/2024  09:48 EST

## 2024-01-30 ENCOUNTER — LAB (OUTPATIENT)
Dept: LAB | Facility: HOSPITAL | Age: 59
End: 2024-01-30
Payer: COMMERCIAL

## 2024-01-30 ENCOUNTER — HOSPITAL ENCOUNTER (OUTPATIENT)
Dept: PHYSICAL THERAPY | Facility: HOSPITAL | Age: 59
Setting detail: THERAPIES SERIES
Discharge: HOME | End: 2024-01-30
Payer: COMMERCIAL

## 2024-01-30 DIAGNOSIS — R60.0 BILATERAL LOWER EXTREMITY EDEMA: Primary | ICD-10-CM

## 2024-01-30 DIAGNOSIS — S81.802D MULTIPLE OPEN WOUNDS OF LOWER LEG, LEFT, SUBSEQUENT ENCOUNTER: ICD-10-CM

## 2024-01-30 DIAGNOSIS — I50.9 ACUTE ON CHRONIC HEART FAILURE, UNSPECIFIED HEART FAILURE TYPE: ICD-10-CM

## 2024-01-30 DIAGNOSIS — S81.801D MULTIPLE OPEN WOUNDS OF LOWER LEG, RIGHT, SUBSEQUENT ENCOUNTER: ICD-10-CM

## 2024-01-30 PROCEDURE — 83036 HEMOGLOBIN GLYCOSYLATED A1C: CPT

## 2024-01-30 PROCEDURE — 36415 COLL VENOUS BLD VENIPUNCTURE: CPT

## 2024-01-30 PROCEDURE — 97597 DBRDMT OPN WND 1ST 20 CM/<: CPT

## 2024-01-30 PROCEDURE — 29580 STRAPPING UNNA BOOT: CPT

## 2024-01-30 PROCEDURE — 80061 LIPID PANEL: CPT

## 2024-01-30 NOTE — THERAPY WOUND CARE TREATMENT
Outpatient Rehabilitation - Wound/Debridement Treatment Note  [facility]     Patient Name: Samara Stringer  : 1965  MRN: [id number]  Today's Date: 2024                 Admit Date: 2024    Visit Dx:    ICD-10-CM ICD-9-CM   1. Bilateral lower extremity edema  R60.0 782.3   2. Multiple open wounds of lower leg, left, subsequent encounter  S81.802D V58.89     894.0   3. Multiple open wounds of lower leg, right, subsequent encounter  S81.801D V58.89     894.0   LLE:    Lateral LLE:    RLE:      Patient Active Problem List   Diagnosis    Acute on chronic heart failure    Tobacco abuse    Pulmonary emphysema    Prediabetes    Bilateral leg edema    Chronic HFrEF (heart failure with reduced ejection fraction)    Cardiomyopathy        Past Medical History:   Diagnosis Date    COPD (chronic obstructive pulmonary disease)         Past Surgical History:   Procedure Laterality Date    LEG SURGERY Left     TUBAL ABDOMINAL LIGATION           EVALUATION   PT Ortho       Row Name 24 1315       Subjective    Subjective Comments Pt removed unna boots to shower just prior to tx today.  States her legs haven't looked this good in years.  -       Subjective Pain    Able to rate subjective pain? yes  -THOM    Pre-Treatment Pain Level 0  -    Post-Treatment Pain Level 0  -    Subjective Pain Comment min pain during debridement  -       Transfers    Sit-Stand [place] (Transfers) independent  -    Stand-Sit [place] (Transfers) independent  -    Comment, (Transfers) seated for tx  -       Gait/Stairs (Locomotion)    [place] (Gait) independent  -              User Key  (r) = Recorded By, (t) = Taken By, (c) = Cosigned By      Initials Name Provider Type    Samara cA, PT Physical Therapist                     [facility] Wound       Row Name 24 1315             Wound 24 Bilateral lower leg Venous Ulcer    Wound - Properties Group Placement Date: 24  [facility]  Present on Original Admission: Y  -LH Side: Bilateral  - Orientation: lower  - Location: leg  - Primary Wound Type: Venous ulcer  -    Wound Image Images linked: 3  -      Dressing Appearance open to air;no drainage  -      Base pink;red;slough;moist;dry  all areas slightly dry today  -      Periwound dry;swelling;edematous  -      Periwound Temperature warm  -      Periwound Skin Turgor soft  -      Edges irregular;open  -      Drainage Characteristics/Odor serosanguineous  -      Drainage Amount scant  -      Care, Wound cleansed with;wound cleanser;debrided;honey applied;saskia boot  -      Dressing Care dressing applied  honey, xeroform to small open areas  -      Periwound Care cleansed with pH balanced cleanser;dry periwound area maintained  -      Retired Wound - Properties Group Placement Date: 01/16/24  - Present on Original Admission: Y  -LH Side: Bilateral  - Orientation: lower  - Location: leg  - Primary Wound Type: Venous ulcer  -    Retired Wound - Properties Group Date first assessed: 01/16/24  - Present on Original Admission: Y  - Side: Bilateral  - Location: leg  - Primary Wound Type: Venous ulcer  -              User Key  (r) = Recorded By, (t) = Taken By, (c) = Cosigned By      Initials Name Provider Type     Samara Alberts, PT Physical Therapist     Juan Diego Guardado, PT Physical Therapist                   Lymphedema       Row Name 01/30/24 9405             Lymphedema Edema Assessment    Ptting Edema Category By severity  -      Pitting Edema Mild  -         Skin Changes/Observations    Lower Extremity Conditions bilateral:;dry;hairless;fragile  -      Lower Extremity Color/Pigment bilateral:;blanchable;hyperpigmented;brawny  -         Lymphedema Pulses/Capillary Refill    Lower Extremity Capillary Refill right:;left:;less than 3 seconds  -         LLE Quick Girth (cm)    Met-heads 21.7 cm  -      Mid foot 21.7 cm  -       "Smallest ankle 23 cm  -JM      Largest calf 29.8 cm  -JM         RLE Quick Girth (cm)    Met-heads 22.8 cm  -JM      Mid foot 21.8 cm  -JM      Smallest ankle 22.7 cm  -JM      Largest calf 36 cm  -JM      RLE Quick Girth Total 103.3  -JM         Compression/Skin Care    Compression/Skin Care skin care;wrapping location  -      Skin Care washed/dried;lotion applied  -      Wrapping Location lower extremity  -      Wrapping Location LE bilateral:;foot to knee  -      Wrapping Comments xeroform over all open areas, unna boots in clamshell application, 4\" coban, size 5 spandage  -                User Key  (r) = Recorded By, (t) = Taken By, (c) = Cosigned By      Initials Name Provider Type    Samara Ac, PT Physical Therapist                    WOUND DEBRIDEMENT  Total area of Debridement: 4cmsq  Debridement Site 1  Location- Site 1: BLE wounds and periwound areas  Selective Debridement- Site 1: Wound Surface <20cmsq  Instruments- Site 1: tweezers  Excised Tissue Description- Site 1: minimum, slough, other (comment) (loose hypertrophic crusts)  Bleeding- Site 1: none                 Recommendation and Plan   PT Assessment/Plan       Row Name 01/30/24 1315          PT Assessment    Functional Limitations Performance in self-care ADL;Other (comment)  -     Impairments Edema;Impaired venous circulation;Pain;Integumentary integrity  -     Assessment Comments Pt with continued improvement in BLE edema and wounds, with only 3-4 small ulcerations remaining.  Wounds slightly dry as pt had removed unna boots to shower prior to tx and left LUIS.  PT continued with therahoney and xeroform to soften necrotic tissues for debridement, BLE unna boots to increase venous return and support wound healing.  Continue with current POC.  -        PT Plan    PT Frequency 2x/week  -     Physical Therapy Interventions (Optional Details) patient/family education;wound care  -     PT Plan Comments debridement, unna " boots  -THOM               User Key  (r) = Recorded By, (t) = Taken By, (c) = Cosigned By      Initials Name Provider Type    Samara Ac, PT Physical Therapist                    Goals   PT OP Goals       Row Name 01/30/24 1315          Time Calculation    PT Goal Re-Cert Due Date 04/15/24  -THOM               User Key  (r) = Recorded By, (t) = Taken By, (c) = Cosigned By      Initials Name Provider Type    Samara Ac, PT Physical Therapist                    PT Goal Re-Cert Due Date: 04/15/24            Time Calculation: Start Time: 1315  Untimed Charges  66185-Hwsk Boot: 20  45147-Dyajihkso debridement: 10  Total Minutes  Untimed Charges Total Minutes: 30   Total Minutes: 30  Therapy Charges for Today       Code Description Service Date Service Provider Modifiers Qty    51709297006 HC JULIO CÉSAR DEBRIDE OPEN WOUND UP TO 20CM 1/30/2024 Samara Alberts, PT GP 1    09193983066 HC PT STAPPING UNNA BOOT 1/30/2024 Samara Alberts, PT GP 1                    Samara Alberts, PT  1/30/2024

## 2024-01-31 LAB
CHOLEST SERPL-MCNC: 162 MG/DL (ref 0–200)
HBA1C MFR BLD: 6.1 % (ref 4.8–5.6)
HDLC SERPL-MCNC: 50 MG/DL (ref 40–60)
LDLC SERPL CALC-MCNC: 95 MG/DL (ref 0–100)
LDLC/HDLC SERPL: 1.88 {RATIO}
TRIGL SERPL-MCNC: 90 MG/DL (ref 0–150)
VLDLC SERPL-MCNC: 17 MG/DL (ref 5–40)

## 2024-02-01 ENCOUNTER — HOSPITAL ENCOUNTER (OUTPATIENT)
Facility: HOSPITAL | Age: 59
Setting detail: HOSPITAL OUTPATIENT SURGERY
Discharge: HOME | End: 2024-02-01
Attending: INTERNAL MEDICINE | Admitting: INTERNAL MEDICINE
Payer: COMMERCIAL

## 2024-02-01 VITALS
WEIGHT: 164 LBS | HEART RATE: 62 BPM | HEIGHT: 66 IN | TEMPERATURE: 97.8 F | DIASTOLIC BLOOD PRESSURE: 71 MMHG | RESPIRATION RATE: 20 BRPM | BODY MASS INDEX: 26.36 KG/M2 | SYSTOLIC BLOOD PRESSURE: 118 MMHG | OXYGEN SATURATION: 88 %

## 2024-02-01 DIAGNOSIS — I42.7 CARDIOMYOPATHY SECONDARY TO DRUG: Primary | Chronic | ICD-10-CM

## 2024-02-01 DIAGNOSIS — I42.9 CARDIOMYOPATHY, UNSPECIFIED TYPE: ICD-10-CM

## 2024-02-01 LAB
ANION GAP SERPL CALCULATED.3IONS-SCNC: 10.5 MMOL/L (ref 5–15)
BUN SERPL-MCNC: 34 MG/DL (ref 6–20)
BUN/CREAT SERPL: 25 (ref 7–25)
CALCIUM SPEC-SCNC: 9 MG/DL (ref 8.6–10.5)
CHLORIDE SERPL-SCNC: 105 MMOL/L (ref 98–107)
CO2 SERPL-SCNC: 30.5 MMOL/L (ref 22–29)
CREAT SERPL-MCNC: 1.36 MG/DL (ref 0.57–1)
DEPRECATED RDW RBC AUTO: 60.2 FL (ref 37–54)
EGFRCR SERPLBLD CKD-EPI 2021: 45.5 ML/MIN/1.73
ERYTHROCYTE [DISTWIDTH] IN BLOOD BY AUTOMATED COUNT: 16.4 % (ref 12.3–15.4)
GLUCOSE SERPL-MCNC: 102 MG/DL (ref 65–99)
HCT VFR BLD AUTO: 44.6 % (ref 34–46.6)
HGB BLD-MCNC: 14.2 G/DL (ref 12–15.9)
MCH RBC QN AUTO: 31.3 PG (ref 26.6–33)
MCHC RBC AUTO-ENTMCNC: 31.8 G/DL (ref 31.5–35.7)
MCV RBC AUTO: 98.5 FL (ref 79–97)
PLATELET # BLD AUTO: 185 10*3/MM3 (ref 140–450)
PMV BLD AUTO: 9.6 FL (ref 6–12)
POTASSIUM SERPL-SCNC: 3.6 MMOL/L (ref 3.5–5.2)
RBC # BLD AUTO: 4.53 10*6/MM3 (ref 3.77–5.28)
SODIUM SERPL-SCNC: 146 MMOL/L (ref 136–145)
WBC NRBC COR # BLD AUTO: 6.76 10*3/MM3 (ref 3.4–10.8)

## 2024-02-01 PROCEDURE — 25010000002 FENTANYL CITRATE (PF) 50 MCG/ML SOLUTION: Performed by: INTERNAL MEDICINE

## 2024-02-01 PROCEDURE — 93454 CORONARY ARTERY ANGIO S&I: CPT | Performed by: INTERNAL MEDICINE

## 2024-02-01 PROCEDURE — 25510000001 IOPAMIDOL PER 1 ML: Performed by: INTERNAL MEDICINE

## 2024-02-01 PROCEDURE — 80048 BASIC METABOLIC PNL TOTAL CA: CPT | Performed by: INTERNAL MEDICINE

## 2024-02-01 PROCEDURE — 25010000002 HEPARIN (PORCINE) PER 1000 UNITS: Performed by: INTERNAL MEDICINE

## 2024-02-01 PROCEDURE — 99152 MOD SED SAME PHYS/QHP 5/>YRS: CPT | Performed by: INTERNAL MEDICINE

## 2024-02-01 PROCEDURE — C1769 GUIDE WIRE: HCPCS | Performed by: INTERNAL MEDICINE

## 2024-02-01 PROCEDURE — 25010000002 MIDAZOLAM PER 1MG: Performed by: INTERNAL MEDICINE

## 2024-02-01 PROCEDURE — 94640 AIRWAY INHALATION TREATMENT: CPT

## 2024-02-01 PROCEDURE — 25010000002 LIDOCAINE 1 % SOLUTION: Performed by: INTERNAL MEDICINE

## 2024-02-01 PROCEDURE — 85027 COMPLETE CBC AUTOMATED: CPT | Performed by: INTERNAL MEDICINE

## 2024-02-01 PROCEDURE — 25810000003 SODIUM CHLORIDE 0.9 % SOLUTION: Performed by: INTERNAL MEDICINE

## 2024-02-01 PROCEDURE — C1894 INTRO/SHEATH, NON-LASER: HCPCS | Performed by: INTERNAL MEDICINE

## 2024-02-01 RX ORDER — IPRATROPIUM BROMIDE AND ALBUTEROL SULFATE 2.5; .5 MG/3ML; MG/3ML
3 SOLUTION RESPIRATORY (INHALATION)
Status: DISCONTINUED | OUTPATIENT
Start: 2024-02-01 | End: 2024-02-01 | Stop reason: HOSPADM

## 2024-02-01 RX ORDER — SODIUM CHLORIDE 9 MG/ML
3 INJECTION, SOLUTION INTRAVENOUS CONTINUOUS
Status: DISCONTINUED | OUTPATIENT
Start: 2024-02-01 | End: 2024-02-01 | Stop reason: HOSPADM

## 2024-02-01 RX ORDER — MIDAZOLAM HYDROCHLORIDE 2 MG/2ML
INJECTION, SOLUTION INTRAMUSCULAR; INTRAVENOUS
Status: DISCONTINUED | OUTPATIENT
Start: 2024-02-01 | End: 2024-02-01 | Stop reason: HOSPADM

## 2024-02-01 RX ORDER — FENTANYL CITRATE 50 UG/ML
INJECTION, SOLUTION INTRAMUSCULAR; INTRAVENOUS
Status: DISCONTINUED | OUTPATIENT
Start: 2024-02-01 | End: 2024-02-01 | Stop reason: HOSPADM

## 2024-02-01 RX ORDER — SODIUM CHLORIDE 9 MG/ML
10 INJECTION, SOLUTION INTRAVENOUS CONTINUOUS
Status: CANCELLED | OUTPATIENT
Start: 2024-02-01 | End: 2024-02-01

## 2024-02-01 RX ORDER — LIDOCAINE HYDROCHLORIDE 10 MG/ML
INJECTION, SOLUTION INFILTRATION; PERINEURAL
Status: DISCONTINUED | OUTPATIENT
Start: 2024-02-01 | End: 2024-02-01 | Stop reason: HOSPADM

## 2024-02-01 RX ORDER — SODIUM CHLORIDE 0.9 % (FLUSH) 0.9 %
10 SYRINGE (ML) INJECTION AS NEEDED
Status: DISCONTINUED | OUTPATIENT
Start: 2024-02-01 | End: 2024-02-01 | Stop reason: HOSPADM

## 2024-02-01 RX ORDER — ACETAMINOPHEN 325 MG/1
650 TABLET ORAL EVERY 4 HOURS PRN
Status: CANCELLED | OUTPATIENT
Start: 2024-02-01

## 2024-02-01 RX ORDER — HEPARIN SODIUM 1000 [USP'U]/ML
INJECTION, SOLUTION INTRAVENOUS; SUBCUTANEOUS
Status: DISCONTINUED | OUTPATIENT
Start: 2024-02-01 | End: 2024-02-01 | Stop reason: HOSPADM

## 2024-02-01 RX ORDER — SODIUM CHLORIDE 0.9 % (FLUSH) 0.9 %
10 SYRINGE (ML) INJECTION EVERY 12 HOURS SCHEDULED
Status: DISCONTINUED | OUTPATIENT
Start: 2024-02-01 | End: 2024-02-01 | Stop reason: HOSPADM

## 2024-02-01 RX ORDER — VERAPAMIL HYDROCHLORIDE 2.5 MG/ML
INJECTION, SOLUTION INTRAVENOUS
Status: DISCONTINUED | OUTPATIENT
Start: 2024-02-01 | End: 2024-02-01 | Stop reason: HOSPADM

## 2024-02-01 RX ORDER — NITROGLYCERIN 0.4 MG/1
0.4 TABLET SUBLINGUAL
Status: CANCELLED | OUTPATIENT
Start: 2024-02-01

## 2024-02-01 RX ADMIN — SODIUM CHLORIDE 3 ML/KG/HR: 9 INJECTION, SOLUTION INTRAVENOUS at 08:40

## 2024-02-01 RX ADMIN — IPRATROPIUM BROMIDE AND ALBUTEROL SULFATE 3 ML: .5; 3 SOLUTION RESPIRATORY (INHALATION) at 09:04

## 2024-02-01 RX ADMIN — Medication 10 ML: at 09:05

## 2024-02-01 NOTE — Clinical Note
Allergies reviewed.  H&P note has been confirmed for the patient. Procedural consent has been signed.  Blood consent hs been signed. Staff has reviewed the patient's labs.  Labs have been reviewed and show abnormalities. Physician is aware of labs. The patient has denied she is pregnant.

## 2024-02-01 NOTE — INTERVAL H&P NOTE
H&P reviewed. The patient was examined and there are no changes to the H&P.      Addendum  Physical examination  Ear  Nose and throat: Normal gag reflex  Neck: Normal central venous pressure  Peripheral pulses: Left and right radial pulses are present with normal Daniel testing  Lungs: Clear to auscultation  Heart: Regular rate and rhythm.  Normal S1.  Normal S2.  No murmurs rubs or gallops.

## 2024-02-01 NOTE — DISCHARGE INSTR - DIET
Heart healthy diet.   Shyla LUCILA Estrella is here today for her B12 injection. She is within the expected time frame. Injection given without incident. See MAR for administration details.     Yris Avilez LPN

## 2024-02-02 ENCOUNTER — APPOINTMENT (OUTPATIENT)
Dept: PHYSICAL THERAPY | Facility: HOSPITAL | Age: 59
End: 2024-02-02
Payer: COMMERCIAL

## 2024-02-06 ENCOUNTER — HOSPITAL ENCOUNTER (OUTPATIENT)
Dept: PHYSICAL THERAPY | Facility: HOSPITAL | Age: 59
Setting detail: THERAPIES SERIES
Discharge: HOME | End: 2024-02-06
Payer: COMMERCIAL

## 2024-02-06 DIAGNOSIS — S81.801D MULTIPLE OPEN WOUNDS OF LOWER LEG, RIGHT, SUBSEQUENT ENCOUNTER: ICD-10-CM

## 2024-02-06 DIAGNOSIS — R60.0 BILATERAL LOWER EXTREMITY EDEMA: Primary | ICD-10-CM

## 2024-02-06 DIAGNOSIS — S81.802D MULTIPLE OPEN WOUNDS OF LOWER LEG, LEFT, SUBSEQUENT ENCOUNTER: ICD-10-CM

## 2024-02-06 DIAGNOSIS — I87.2 VENOUS STASIS DERMATITIS OF BOTH LOWER EXTREMITIES: ICD-10-CM

## 2024-02-06 PROCEDURE — 29580 STRAPPING UNNA BOOT: CPT

## 2024-02-06 PROCEDURE — 97597 DBRDMT OPN WND 1ST 20 CM/<: CPT

## 2024-02-06 NOTE — THERAPY WOUND CARE TREATMENT
Outpatient Rehabilitation - Wound/Debridement Treatment Note   [facility]     Patient Name: Samara Stringer  : 1965  MRN: [id number]  Today's Date: 2024                 Admit Date: 2024    Visit Dx:    ICD-10-CM ICD-9-CM   1. Bilateral lower extremity edema  R60.0 782.3   2. Multiple open wounds of lower leg, left, subsequent encounter  S81.802D V58.89     894.0   3. Multiple open wounds of lower leg, right, subsequent encounter  S81.801D V58.89     894.0   4. Venous stasis dermatitis of both lower extremities  I87.2 454.1   Posterior R calf:    Anterior LLE:    Lateral LLE:      Patient Active Problem List   Diagnosis    Acute on chronic heart failure    Tobacco abuse    Pulmonary emphysema    Prediabetes    Bilateral leg edema    Chronic HFrEF (heart failure with reduced ejection fraction)    Cardiomyopathy        Past Medical History:   Diagnosis Date    COPD (chronic obstructive pulmonary disease)         Past Surgical History:   Procedure Laterality Date    CARDIAC CATHETERIZATION N/A 2024    Procedure: Coronary angiography;  Surgeon: David Grande MD;  Location:  [facility];  Service: Cardiology;  Laterality: N/A;    LEG SURGERY Left     TUBAL ABDOMINAL LIGATION           EVALUATION   PT Ortho       Row Name 24 1300       Subjective    Subjective Comments Pt reports her heart cath showed no blockages.  No new complaints, kept unna boots in place from last tx and is agreeable to trial once/week tx.  -       Subjective Pain    Able to rate subjective pain? yes  -    Pre-Treatment Pain Level 0  -    Post-Treatment Pain Level 2  -    Subjective Pain Comment min pain with debridement LLE wounds  -       Transfers    Sit-Stand [place] (Transfers) independent  -    Stand-Sit [place] (Transfers) independent  -    Comment, (Transfers) seated for tx  -       Gait/Stairs (Locomotion)    [place] Level (Gait) independent  -               User Key  (r) = Recorded By, (t) = Taken By, (c) = Cosigned By      Initials Name Provider Type    Samara Ac, PT Physical Therapist                     LDA Wound       Row Name 02/06/24 1300             Wound 01/16/24 Bilateral lower leg Venous Ulcer    Wound - Properties Group Placement Date: 01/16/24  - Present on Original Admission: Y  -LH Side: Bilateral  -LH Orientation: lower  -LH Location: leg  -LH Primary Wound Type: Venous ulcer  -    Wound Image Images linked: 3  -JM      Dressing Appearance dry;intact;moist drainage  -      Base pink;red;slough;moist;dry  moist pink, min residual slough lat LLE and post R calf  -      Periwound dry;swelling;edematous  -      Periwound Temperature warm  -      Periwound Skin Turgor soft  -      Edges irregular;open  -      Drainage Characteristics/Odor serosanguineous  -      Drainage Amount scant  -      Care, Wound cleansed with;wound cleanser;debrided;honey applied;saskia boot  -      Dressing Care dressing applied;petroleum-based;gauze  therahoney and xeroform to small open areas, unna boot  -      Periwound Care cleansed with pH balanced cleanser;dry periwound area maintained  -      Retired Wound - Properties Group Placement Date: 01/16/24  - Present on Original Admission: Y  -LH Side: Bilateral  -LH Orientation: lower  -LH Location: leg  -LH Primary Wound Type: Venous ulcer  -    Retired Wound - Properties Group Date first assessed: 01/16/24  - Present on Original Admission: Y  -LH Side: Bilateral  -LH Location: leg  -LH Primary Wound Type: Venous ulcer  -LH              User Key  (r) = Recorded By, (t) = Taken By, (c) = Cosigned By      Initials Name Provider Type    Samara Ac, PT Physical Therapist     Juan Diego Guardado, PT Physical Therapist                   Lymphedema       Row Name 02/06/24 1300             Lymphedema Edema Assessment    Ptting Edema Category By severity  -      Pitting Edema Mild   "-         Skin Changes/Observations    Lower Extremity Conditions bilateral:;dry;hairless;fragile  -      Lower Extremity Color/Pigment bilateral:;blanchable;hyperpigmented;brawny  -         Lymphedema Pulses/Capillary Refill    Lower Extremity Capillary Refill right:;left:;less than 3 seconds  -         Compression/Skin Care    Compression/Skin Care skin care;wrapping location  -      Skin Care washed/dried;lotion applied  -      Wrapping Location lower extremity  -      Wrapping Location LE bilateral:;foot to knee  -      Wrapping Comments xeroform over all open areas, unna boots in clamshell application, 4\" coban, size 5 spandage  -                User Key  (r) = Recorded By, (t) = Taken By, (c) = Cosigned By      Initials Name Provider Type    Samara Ac, PT Physical Therapist                    WOUND DEBRIDEMENT  Total area of Debridement: 4cmsq  Debridement Site 1  Location- Site 1: BLE wounds and periwound areas  Selective Debridement- Site 1: Wound Surface <20cmsq  Instruments- Site 1: tweezers  Excised Tissue Description- Site 1: minimum, slough  Bleeding- Site 1: none              Therapy Education       Row Name 02/06/24 1300             Therapy Education    Education Details Continuation of current tx but trial of once/week.  Pt may remove unna boots just prior to next tx and shower.  -      Given Symptoms/condition management;Edema management;Bandaging/dressing change  -      Program Progressed  -      How Provided Verbal;Demonstration  -      Provided to Patient;Other (comment)  -      Level of Understanding Verbalized  -                User Key  (r) = Recorded By, (t) = Taken By, (c) = Cosigned By      Initials Name Provider Type    Samara Ac, PT Physical Therapist                    Recommendation and Plan   PT Assessment/Plan       Row Name 02/06/24 1300          PT Assessment    Functional Limitations Performance in self-care ADL;Other (comment)  " -     Impairments Edema;Impaired venous circulation;Pain;Integumentary integrity  -     Assessment Comments BLE edema and small ulcerations improving with current tx.  PT able to mostly debride slough from wound beds, with only minimal residual slough remaining.  Therhoney and xeroform helping with autolytic debridement, pt continues to benefit from unna boots.  Plan to trial once/week tx due to improvement.  -        PT Plan    PT Frequency 1x/week  -     Physical Therapy Interventions (Optional Details) patient/family education;wound care  -     PT Plan Comments debridement, unna boots  -               User Key  (r) = Recorded By, (t) = Taken By, (c) = Cosigned By      Initials Name Provider Type    Samara Ac, PT Physical Therapist                    Goals   PT OP Goals       Row Name 02/06/24 1300          Time Calculation    PT Goal Re-Cert Due Date 04/15/24  -               User Key  (r) = Recorded By, (t) = Taken By, (c) = Cosigned By      Initials Name Provider Type    Samara Ac, PT Physical Therapist                    PT Goal Re-Cert Due Date: 04/15/24            Time Calculation: Start Time: 1300  Untimed Charges  06229-Vtel Boot: 20  35430-Phefxsvol debridement: 10  Total Minutes  Untimed Charges Total Minutes: 30   Total Minutes: 30  Therapy Charges for Today       Code Description Service Date Service Provider Modifiers Qty    01178798130 HC PT STAPPING UNNA BOOT 2/6/2024 Samara Alberts, PT GP 1    70773204877 HC JULIO CÉSAR DEBRIDE OPEN WOUND UP TO 20CM 2/6/2024 Samara Alberts, PT GP 1                    Samara Alberts, PT  2/6/2024

## 2024-02-09 ENCOUNTER — APPOINTMENT (OUTPATIENT)
Dept: PHYSICAL THERAPY | Facility: HOSPITAL | Age: 59
End: 2024-02-09
Payer: COMMERCIAL

## 2024-02-13 ENCOUNTER — APPOINTMENT (OUTPATIENT)
Dept: PHYSICAL THERAPY | Facility: HOSPITAL | Age: 59
End: 2024-02-13
Payer: COMMERCIAL

## 2024-02-16 ENCOUNTER — HOSPITAL ENCOUNTER (OUTPATIENT)
Dept: PHYSICAL THERAPY | Facility: HOSPITAL | Age: 59
Setting detail: THERAPIES SERIES
Discharge: HOME | End: 2024-02-16
Payer: COMMERCIAL

## 2024-02-16 DIAGNOSIS — I87.2 VENOUS STASIS DERMATITIS OF BOTH LOWER EXTREMITIES: ICD-10-CM

## 2024-02-16 DIAGNOSIS — R60.0 BILATERAL LOWER EXTREMITY EDEMA: Primary | ICD-10-CM

## 2024-02-16 DIAGNOSIS — S81.802D MULTIPLE OPEN WOUNDS OF LOWER LEG, LEFT, SUBSEQUENT ENCOUNTER: ICD-10-CM

## 2024-02-16 DIAGNOSIS — S81.801D MULTIPLE OPEN WOUNDS OF LOWER LEG, RIGHT, SUBSEQUENT ENCOUNTER: ICD-10-CM

## 2024-02-16 PROCEDURE — 29580 STRAPPING UNNA BOOT: CPT

## 2024-02-16 PROCEDURE — 97597 DBRDMT OPN WND 1ST 20 CM/<: CPT

## 2024-02-20 ENCOUNTER — HOSPITAL ENCOUNTER (OUTPATIENT)
Dept: PHYSICAL THERAPY | Facility: HOSPITAL | Age: 59
Setting detail: THERAPIES SERIES
Discharge: HOME | End: 2024-02-20
Payer: COMMERCIAL

## 2024-02-20 DIAGNOSIS — S81.801D MULTIPLE OPEN WOUNDS OF LOWER LEG, RIGHT, SUBSEQUENT ENCOUNTER: ICD-10-CM

## 2024-02-20 DIAGNOSIS — S81.802D MULTIPLE OPEN WOUNDS OF LOWER LEG, LEFT, SUBSEQUENT ENCOUNTER: ICD-10-CM

## 2024-02-20 DIAGNOSIS — I87.2 VENOUS STASIS DERMATITIS OF BOTH LOWER EXTREMITIES: ICD-10-CM

## 2024-02-20 DIAGNOSIS — R60.0 BILATERAL LOWER EXTREMITY EDEMA: Primary | ICD-10-CM

## 2024-02-20 PROCEDURE — 29580 STRAPPING UNNA BOOT: CPT

## 2024-02-20 PROCEDURE — 97597 DBRDMT OPN WND 1ST 20 CM/<: CPT

## 2024-02-20 NOTE — THERAPY WOUND CARE TREATMENT
Outpatient Rehabilitation - Wound/Debridement Treatment Note  [facility]     Patient Name: Samara Stringer  : 1965  MRN: [id number]  Today's Date: 2024                 Admit Date: 2024    Visit Dx:    ICD-10-CM ICD-9-CM   1. Bilateral lower extremity edema  R60.0 782.3   2. Multiple open wounds of lower leg, left, subsequent encounter  S81.802D V58.89     894.0   3. Multiple open wounds of lower leg, right, subsequent encounter  S81.801D V58.89     894.0   4. Venous stasis dermatitis of both lower extremities  I87.2 454.1     L lateral leg      L proximal leg      R posterior leg      Patient Active Problem List   Diagnosis    Acute on chronic heart failure    Tobacco abuse    Pulmonary emphysema    Prediabetes    Bilateral leg edema    Chronic HFrEF (heart failure with reduced ejection fraction)    Cardiomyopathy        Past Medical History:   Diagnosis Date    COPD (chronic obstructive pulmonary disease)         Past Surgical History:   Procedure Laterality Date    CARDIAC CATHETERIZATION N/A 2024    Procedure: Coronary angiography;  Surgeon: David Grande MD;  Location: [facility] CATH INVASIVE LOCATION;  Service: Cardiology;  Laterality: N/A;    LEG SURGERY Left     TUBAL ABDOMINAL LIGATION           EVALUATION   PT Ortho       Row Name 24 1400       Subjective    Subjective Comments Pt report she has still had moderate itchiness/buring of BLEs, thinks she probaly has new scabs/ulcers as they normally start with this same buring/itchy sensation.  -       Subjective Pain    Able to rate subjective pain? yes  -    Pre-Treatment Pain Level 2  -    Post-Treatment Pain Level 2  -    Subjective Pain Comment Burning/itchiness pain  -       Transfers    Sit-Stand [place] (Transfers) independent  -    Stand-Sit [place] (Transfers) independent  -    Comment, (Transfers) seated for tx  -       Gait/Stairs (Locomotion)    [place] Level (Gait) independent   -              User Key  (r) = Recorded By, (t) = Taken By, (c) = Cosigned By      Initials Name Provider Type     Juan Diego Guardado, PT Physical Therapist                     LDA Wound       Row Name 02/20/24 1400             Wound 01/16/24 Bilateral lower leg Venous Ulcer    Wound - Properties Group Placement Date: 01/16/24  - Present on Original Admission: Y  -LH Side: Bilateral  -LH Orientation: lower  -LH Location: leg  -LH Primary Wound Type: Venous ulcer  -    Wound Image Images linked: 3  -LH      Dressing Appearance intact;moist drainage  -LH      Base pink;red;slough;moist;dry  1 moist pink areas lat LLE, and 1 post R calf  -LH      Periwound dry;swelling;edematous  -      Periwound Temperature warm  -      Periwound Skin Turgor soft  -LH      Edges irregular;open  -LH      Drainage Characteristics/Odor serosanguineous  -      Drainage Amount small  -LH      Care, Wound cleansed with;wound cleanser;debrided;honey applied;saskia boot  -      Dressing Care dressing applied;petroleum-based;gauze  therahoney, xeroform to open areas, unna boots  -      Periwound Care cleansed with pH balanced cleanser;dry periwound area maintained  -      Retired Wound - Properties Group Placement Date: 01/16/24  - Present on Original Admission: Y  -LH Side: Bilateral  -LH Orientation: lower  -LH Location: leg  -LH Primary Wound Type: Venous ulcer  -LH    Retired Wound - Properties Group Date first assessed: 01/16/24  - Present on Original Admission: Y  -LH Side: Bilateral  -LH Location: leg  -LH Primary Wound Type: Venous ulcer  -LH              User Key  (r) = Recorded By, (t) = Taken By, (c) = Cosigned By      Initials Name Provider Type     Juan Diego Guardado, PT Physical Therapist                   Lymphedema       Row Name 02/20/24 1400             Lymphedema Edema Assessment    Ptting Edema Category By severity  -      Pitting Edema Mild  -         Skin Changes/Observations    Lower Extremity  "Conditions bilateral:;dry;hairless;fragile  -      Lower Extremity Color/Pigment bilateral:;blanchable;hyperpigmented;brawny  -         Lymphedema Pulses/Capillary Refill    Lower Extremity Capillary Refill right:;left:;less than 3 seconds  -         Compression/Skin Care    Compression/Skin Care skin care;wrapping location  -      Skin Care washed/dried;lotion applied  -      Wrapping Location lower extremity  -      Wrapping Location LE bilateral:;foot to knee  -      Wrapping Comments xeroform over all open areas, unna boots in clamshell application, 4\" coban, size 5 spandage  -                User Key  (r) = Recorded By, (t) = Taken By, (c) = Cosigned By      Initials Name Provider Type     Juan Diego Guardado, PT Physical Therapist                    WOUND DEBRIDEMENT  Total area of Debridement: 2cm2  Debridement Site 1  Location- Site 1: BLE wounds and periwound areas  Selective Debridement- Site 1: Wound Surface <20cmsq  Instruments- Site 1: tweezers  Excised Tissue Description- Site 1: minimum, slough, other (comment) (hypertrophic crust)  Bleeding- Site 1: none              Therapy Education       Row Name 02/20/24 1400             Therapy Education    Education Details Continue current POC, likely remain in UB until wounds are fully healed and then can transition to long term edema management system.  -      Given Symptoms/condition management;Edema management;Bandaging/dressing change  -      Program Reinforced;Modified  -      How Provided Verbal;Demonstration  -      Provided to Patient;Other (comment)  -      Level of Understanding Verbalized  -                User Key  (r) = Recorded By, (t) = Taken By, (c) = Cosigned By      Initials Name Provider Type    Juan Diego Holt, PT Physical Therapist                    Recommendation and Plan   PT Assessment/Plan       Row Name 02/20/24 1400          PT Assessment    Functional Limitations Performance in self-care ADL;Other " (comment)  -     Impairments Edema;Impaired venous circulation;Pain;Integumentary integrity  -     Assessment Comments Pt continuing with steady improvements in BLE wound edge contraction. Pt with full resurfacing of one additional open wound to the LLE. Pt continuing to require debridement of small amount of slough and crusts from wound bases to reduce bacterial load. Pt would continue to benefit from current POC to promote wound healing.  -     Rehab Potential Good  -     Patient/caregiver participated in establishment of treatment plan and goals Yes  -     Patient would benefit from skilled therapy intervention Yes  -        PT Plan    PT Frequency 2x/week  -     Physical Therapy Interventions (Optional Details) patient/family education;wound care  -     PT Plan Comments debridement, unna boots  -               User Key  (r) = Recorded By, (t) = Taken By, (c) = Cosigned By      Initials Name Provider Type     Juan Diego Guardado, PT Physical Therapist                    Goals   PT OP Goals       Row Name 02/20/24 1415          Time Calculation    PT Goal Re-Cert Due Date 04/15/24  -               User Key  (r) = Recorded By, (t) = Taken By, (c) = Cosigned By      Initials Name Provider Type     Juan Diego Guardado, PT Physical Therapist                    PT Goal Re-Cert Due Date: 04/15/24            Time Calculation: Start Time: 1300  Untimed Charges  12287-Ynuf Boot: 20  98239-Jvtllmdru debridement: 10  Total Minutes  Untimed Charges Total Minutes: 30   Total Minutes: 30  Therapy Charges for Today       Code Description Service Date Service Provider Modifiers Qty    02455813187 HC JULIO CÉSAR DEBRIDE OPEN WOUND UP TO 20CM 2/20/2024 Juan Diego Guardado, PT GP 1    17512530735 HC PT STAPPING UNNA BOOT 2/20/2024 Juan Diego Guardado, PT GP 1                    Juan Diego Guardado PT  2/20/2024

## 2024-02-22 NOTE — TELEPHONE ENCOUNTER
Patient contacted the office stating she is out of her Lasix and needs a refill. States she has been taking the increased dose when needed. Okay to send in Lasix to [facility] in [place]?

## 2024-02-23 RX ORDER — FUROSEMIDE 40 MG/1
40 TABLET ORAL DAILY
Qty: 30 TABLET | Refills: 2 | Status: SHIPPED | OUTPATIENT
Start: 2024-02-23

## 2024-02-27 ENCOUNTER — HOSPITAL ENCOUNTER (OUTPATIENT)
Dept: PHYSICAL THERAPY | Facility: HOSPITAL | Age: 59
Setting detail: THERAPIES SERIES
Discharge: HOME | End: 2024-02-27
Payer: COMMERCIAL

## 2024-02-27 DIAGNOSIS — R60.0 BILATERAL LOWER EXTREMITY EDEMA: Primary | ICD-10-CM

## 2024-02-27 DIAGNOSIS — I87.2 VENOUS STASIS DERMATITIS OF BOTH LOWER EXTREMITIES: ICD-10-CM

## 2024-02-27 DIAGNOSIS — S81.802D MULTIPLE OPEN WOUNDS OF LOWER LEG, LEFT, SUBSEQUENT ENCOUNTER: ICD-10-CM

## 2024-02-27 DIAGNOSIS — S81.801D MULTIPLE OPEN WOUNDS OF LOWER LEG, RIGHT, SUBSEQUENT ENCOUNTER: ICD-10-CM

## 2024-02-27 PROCEDURE — 29581 APPL MULTLAYER CMPRN SYS LEG: CPT

## 2024-02-27 NOTE — THERAPY WOUND CARE TREATMENT
Outpatient Rehabilitation - Wound/Debridement Treatment Note  [facility]     Patient Name: Samara Stringer  : 1965  MRN: [id number]  Today's Date: 2024                 Admit Date: 2024    Visit Dx:    ICD-10-CM ICD-9-CM   1. Bilateral lower extremity edema  R60.0 782.3   2. Multiple open wounds of lower leg, left, subsequent encounter  S81.802D V58.89     894.0   3. Multiple open wounds of lower leg, right, subsequent encounter  S81.801D V58.89     894.0   4. Venous stasis dermatitis of both lower extremities  I87.2 454.1     BLE      L lateral leg      R posterior leg        Patient Active Problem List   Diagnosis    Acute on chronic heart failure    Tobacco abuse    Pulmonary emphysema    Prediabetes    Bilateral leg edema    Chronic HFrEF (heart failure with reduced ejection fraction)    Cardiomyopathy        Past Medical History:   Diagnosis Date    COPD (chronic obstructive pulmonary disease)         Past Surgical History:   Procedure Laterality Date    CARDIAC CATHETERIZATION N/A 2024    Procedure: Coronary angiography;  Surgeon: David Grande MD;  Location: [facility] CATH INVASIVE LOCATION;  Service: Cardiology;  Laterality: N/A;    LEG SURGERY Left     TUBAL ABDOMINAL LIGATION           EVALUATION   PT Ortho       Row Name 24 1300       Subjective    Subjective Comments No new issues/complaints. Reports she removed the UB yesterday to shower. Reports she thinks her leg wounds are healing well. Reports she has compression garments at home she plans to wear. Reports she has not taken her diuretics in 3 days.  -       Subjective Pain    Able to rate subjective pain? yes  -    Pre-Treatment Pain Level 0  -LH    Post-Treatment Pain Level 0  -LH       Transfers    Sit-Stand [place] (Transfers) independent  -LH    Stand-Sit [place] (Transfers) independent  -    Comment, (Transfers) seated for tx  -       Gait/Stairs (Locomotion)    [place] Level (Gait)  "independent  -              User Key  (r) = Recorded By, (t) = Taken By, (c) = Cosigned By      Initials Name Provider Type     Juan Diego Guardado, PT Physical Therapist                     LDA Wound       Row Name 02/27/24 1300             Wound 01/16/24 Bilateral lower leg Venous Ulcer    Wound - Properties Group Placement Date: 01/16/24  - Present on Original Admission: Y  -LH Side: Bilateral  -LH Orientation: lower  -LH Location: leg  - Primary Wound Type: Venous ulcer  -    Wound Image Images linked: 3  -LH      Dressing Appearance open to air  -LH      Base scab  1 crusted area to the L lateral leg, 1 scabbed area to R posterior leg  -      Periwound dry;swelling;edematous  -      Periwound Temperature warm  -      Periwound Skin Turgor soft  -      Edges irregular;open  -LH      Care, Wound cleansed with;soap and water  -      Dressing Care dressing applied;foam;border dressing;multi-layer wrap  4\" optifoam, MLW  -LH      Periwound Care cleansed with pH balanced cleanser;dry periwound area maintained  -      Retired Wound - Properties Group Placement Date: 01/16/24  - Present on Original Admission: Y  -LH Side: Bilateral  -LH Orientation: lower  -LH Location: leg  - Primary Wound Type: Venous ulcer  -    Retired Wound - Properties Group Date first assessed: 01/16/24  - Present on Original Admission: Y  -LH Side: Bilateral  -LH Location: leg  - Primary Wound Type: Venous ulcer  -              User Key  (r) = Recorded By, (t) = Taken By, (c) = Cosigned By      Initials Name Provider Type     Juan Diego Guardado, PT Physical Therapist                   Lymphedema       Row Name 02/27/24 1300             Lymphedema Edema Assessment    Ptting Edema Category By severity  -      Pitting Edema Mild  -         Skin Changes/Observations    Lower Extremity Conditions bilateral:;dry;hairless;fragile  -      Lower Extremity Color/Pigment bilateral:;blanchable;hyperpigmented;brawny  " mild erythema  -         Lymphedema Pulses/Capillary Refill    Capillary Refill lower extremity capillary refill  -      Lower Extremity Capillary Refill right:;left:;less than 3 seconds  -         Lymphedema Measurements    Measurement Type(s) Quick Girth  -      Quick Girth Areas Lower extremities  -         LLE Quick Girth (cm)    Mid foot 21.8 cm  -LH      Smallest ankle 23.2 cm  -LH      Largest calf 32.5 cm  -LH         RLE Quick Girth (cm)    Mid foot 22.5 cm  -LH      Smallest ankle 22.7 cm  -LH      Largest calf 35.1 cm  -LH      RLE Quick Girth Total 80.3  -         Compression/Skin Care    Compression/Skin Care skin care;wrapping location  -      Skin Care washed/dried;lotion applied  -      Wrapping Location lower extremity  -      Wrapping Location LE bilateral:;foot to knee  -      Wrapping Comments BLE size 4 compressogrips applied doubled distally for gradient compression.  -                User Key  (r) = Recorded By, (t) = Taken By, (c) = Cosigned By      Initials Name Provider Type    Juan Diego Holt, PT Physical Therapist                    WOUND DEBRIDEMENT                    Therapy Education       Row Name 02/27/24 1300             Therapy Education    Education Details Reviewed continued importance of BLE elevation and compression, educated on donning/doffing of compression garments, will fit in Juzo size II stockings. Leave the 2 remaining areas covered for approximately 1 more week then may leave [facility]. Discussed tentative DC and option to return if legs worsen.  -      Given Symptoms/condition management;Edema management;Bandaging/dressing change  -      Program Reinforced;Modified  -      How Provided Verbal;Demonstration  -      Provided to Patient;Other (comment)  -      Level of Understanding Verbalized  -                User Key  (r) = Recorded By, (t) = Taken By, (c) = Cosigned By      Initials Name Provider Type    Juan Diego Holt, PT Physical  Therapist                    Recommendation and Plan   PT Assessment/Plan       Row Name 02/27/24 1300          PT Assessment    Functional Limitations Performance in self-care ADL;Other (comment)  -     Impairments Edema;Impaired venous circulation;Pain;Integumentary integrity  -     Assessment Comments Pt presenting this date with continued improvements in BLE wound epithelialization with only small area of crusts remaining to L lateral leg and small scab to the R posterior calf. Pt with minimal remaining BLE erythema. Pt does however demonstrate small increase in BLE edema likely d/t pt not taking diuretics for past 3 days and no BLE compression in 1 day. PT transitioned pt to BLE compressogrips for light compression to further promote venous return and improve skin integrity. PT provided pt with BLE limb girth measurements to allow for purchasing of personal compression stockings. Pt would be  Juzo size II. PT plans to tentatively DC pt with option to return without new referral until Re-cert date.  -     Rehab Potential Good  -     Patient/caregiver participated in establishment of treatment plan and goals Yes  -     Patient would benefit from skilled therapy intervention Yes  -        PT Plan    PT Frequency Other (comment)  tentative DC  -     Physical Therapy Interventions (Optional Details) patient/family education;wound care  -     PT Plan Comments Tentative DC.  -               User Key  (r) = Recorded By, (t) = Taken By, (c) = Cosigned By      Initials Name Provider Type    Juan Diego Holt, PT Physical Therapist                    Goals   PT OP Goals       Row Name 02/27/24 1342          Time Calculation    PT Goal Re-Cert Due Date 04/15/24  -               User Key  (r) = Recorded By, (t) = Taken By, (c) = Cosigned By      Initials Name Provider Type    Juan Diego Holt, PT Physical Therapist                    PT Goal Re-Cert Due Date: 04/15/24            Time Calculation:  Start Time: 1300  Untimed Charges  Wound Care: 79185 Multilayer comp below knee  01853-Tevykrjeqy comp below knee: 25  Total Minutes  Untimed Charges Total Minutes: 25   Total Minutes: 25  Therapy Charges for Today       Code Description Service Date Service Provider Modifiers Qty    18020336252 HC PT MULTI LAYER COMP SYS BELOW KNEE 2/27/2024 Juan Diego Guardado, PT GP 1                    Juan Diego Guardado, PT  2/27/2024

## 2024-03-04 ENCOUNTER — OFFICE VISIT (OUTPATIENT)
Dept: CARDIOLOGY | Facility: CLINIC | Age: 59
End: 2024-03-04
Payer: COMMERCIAL

## 2024-03-04 VITALS
HEART RATE: 84 BPM | DIASTOLIC BLOOD PRESSURE: 72 MMHG | WEIGHT: 146 LBS | BODY MASS INDEX: 23.46 KG/M2 | OXYGEN SATURATION: 94 % | HEIGHT: 66 IN | SYSTOLIC BLOOD PRESSURE: 122 MMHG

## 2024-03-04 DIAGNOSIS — I50.22 CHRONIC HFREF (HEART FAILURE WITH REDUCED EJECTION FRACTION): Primary | Chronic | ICD-10-CM

## 2024-03-04 DIAGNOSIS — R73.03 PREDIABETES: Chronic | ICD-10-CM

## 2024-03-04 DIAGNOSIS — I42.0 DILATED CARDIOMYOPATHY: ICD-10-CM

## 2024-03-04 DIAGNOSIS — Z72.0 TOBACCO ABUSE: Chronic | ICD-10-CM

## 2024-03-04 PROCEDURE — 99214 OFFICE O/P EST MOD 30 MIN: CPT | Performed by: INTERNAL MEDICINE

## 2024-03-04 NOTE — PROGRESS NOTES
"    [facility] Cardiology Office Follow Up Note    Samara Stringer  [id number]  2024    Primary Care Provider: Provider, No Known    Chief Complaint   Patient presents with    Follow-up       Subjective     History of Present Illness:   Samara Stringer is a 58 y.o. female  with chronic HFrEF, NICM, prediabetes, and tobacco abuse who presents to the Cardiology Clinic for follow up after recent coronary angiography.  This showed essentially normal coronaries.  Patient has successfully lost over 20 pounds after increasing her Lasix dose (totaling about 40 pounds since early January).  Says she feels great.  Much better than before.  No chest pain or dyspnea.  No orthopnea or PND.  She was seen by wound care for her legs and has been discharged from their care.  Legs are doing much better as well.    Past Cardiac Testin. Last Coronary Angio: 24  Angiographically \"near-normal\" coronary arteries  Nonischemic dilated cardiomyopathy      2. Last Echo: 24    Left ventricular systolic function is moderately decreased. Calculated left ventricular 3D EF = 31% Left ventricular ejection fraction appears to be 31 - 35%.    The left ventricular cavity is moderately dilated. Left ventricular wall thickness is consistent with mild concentric hypertrophy.    Grade II diastolic dysfunction.    The right ventricular cavity is mildly dilated.  Borderline right ventricular systolic function.    Moderate biatrial dilation.    Moderate mitral valve regurgitation is present, eccentric and directed posteriorly.  Eccentricity of the regurgitant jet may underestimate severity of regurgitation.    Moderate to severe tricuspid valve regurgitation is present.    Estimated right ventricular systolic pressure from tricuspid regurgitation is moderately elevated (45-55 mmHg). Calculated right ventricular systolic pressure from tricuspid regurgitation is 49 mmHg.    There is a trivial circumferential pericardial " effusion.     3. Prior Stress Testing: none    4. Prior Holter Monitor: none    Review of Systems:  Review of Systems   Constitutional: Negative.    Respiratory: Negative.     Cardiovascular: Negative.    Gastrointestinal: Negative.    Musculoskeletal: Negative.    Neurological: Negative.      I have reviewed and/or updated the patient's past medical, past surgical, family, social history, problem list and allergies as appropriate.       Current Outpatient Medications:     aspirin 81 MG EC tablet, Take 1 tablet by mouth Daily for 90 days., Disp: 30 tablet, Rfl: 2    dapagliflozin Propanediol (Farxiga) 10 MG tablet, Take 10 mg by mouth Daily for 120 days., Disp: 30 tablet, Rfl: 3    escitalopram (LEXAPRO) 10 MG tablet, Take 1 tablet by mouth Daily., Disp: , Rfl:     furosemide (LASIX) 40 MG tablet, Take 1 tablet by mouth Daily., Disp: 30 tablet, Rfl: 2    metoprolol succinate XL (TOPROL-XL) 100 MG 24 hr tablet, Take 1 tablet by mouth Daily., Disp: 30 tablet, Rfl: 3    rosuvastatin (CRESTOR) 10 MG tablet, Take 1 tablet by mouth Daily., Disp: , Rfl:     sacubitril-valsartan (Entresto)  MG tablet, Take 1 tablet by mouth 2 (Two) Times a Day., Disp: , Rfl:     spironolactone (ALDACTONE) 25 MG tablet, Take 1 tablet by mouth Daily., Disp: , Rfl:     albuterol (ACCUNEB) 0.63 MG/3ML nebulizer solution, Take 3 mL by nebulization Every 6 (Six) Hours As Needed for Wheezing. (Patient not taking: Reported on 3/4/2024), Disp: 20 each, Rfl: 0    albuterol sulfate  (90 Base) MCG/ACT inhaler, Inhale 2 puffs Every 4 (Four) Hours As Needed for Wheezing. (Patient not taking: Reported on 3/4/2024), Disp: 17 g, Rfl: 0    budesonide-formoterol (SYMBICORT) 160-4.5 MCG/ACT inhaler, Inhale 2 puffs 2 (Two) Times a Day. Pt takes prn (Patient not taking: Reported on 3/4/2024), Disp: , Rfl:     doxycycline (MONODOX) 100 MG capsule, Take 1 capsule by mouth 2 (Two) Times a Day. (Patient not taking: Reported on 1/26/2024), Disp: 20  "capsule, Rfl: 0    meloxicam (MOBIC) 7.5 MG tablet, Take 1 tablet by mouth Daily. (Patient not taking: Reported on 3/4/2024), Disp: 14 tablet, Rfl: 0    predniSONE (DELTASONE) 20 MG tablet, Take 1 tablet by mouth 2 (Two) Times a Day. (Patient not taking: Reported on 1/26/2024), Disp: 10 tablet, Rfl: 0       Objective     Vitals:  Vitals:    03/04/24 1013   BP: 122/72   BP Location: Left arm   Patient Position: Sitting   Pulse: 84   SpO2: 94%   Weight: 66.2 kg (146 lb)   Height: 167.6 cm (65.98\")       Physical Exam:  Vitals reviewed.   Constitutional:       Appearance: Healthy appearance. Not in distress.   Neck:      Vascular: No JVD.   Pulmonary:      Effort: Pulmonary effort is normal.      Breath sounds: Normal breath sounds.   Cardiovascular:      Normal rate. Regular rhythm. Normal S1. Normal S2.       Murmurs: There is no murmur.      No gallop.  No click. No rub.   Pulses:     Intact distal pulses.   Edema:     Pretibial: bilateral trace pitting edema of the pretibial area.     Ankle: bilateral trace pitting edema of the ankle.     Feet: bilateral trace pitting edema of the feet.  Abdominal:      General: There is no distension.      Palpations: Abdomen is soft.      Tenderness: There is no abdominal tenderness.   Skin:     General: Skin is warm and dry.         Results Review:   I reviewed the patient's new clinical results.  I reviewed the patient's new imaging results and agree with the interpretation.  I reviewed the patient's other test results and agree with the interpretation  I personally viewed and interpreted the patient's EKG/Telemetry data    Hemoglobin A1c (01/30/2024 15:43)  Lipid Panel (01/30/2024 15:43)    Procedures        Assessment & Plan   Diagnoses and all orders for this visit:    1. Chronic HFrEF (heart failure with reduced ejection fraction) (Primary)  2. Dilated cardiomyopathy  Nonischemic, dilated cardiomyopathy with recent angiography showing no obstructive CAD.  EF around 30%, " down from 39% about 2 years ago. Grade II diastolic dysfunction, moderate to severe valvular regurgitation, RVSP 50.  Medical therapy has been optimized and further titration is somewhat precluded by low blood pressure.  Compensated and euvolemic on exam today.  Dry weight probably around 145.  -- Since EF has been in the moderately reduced range for at least the past 2 years, I do suspect this degree of dysfunction is probably permanent (especially that her LV is already dilated).  Considering her age, I do think she warrants consideration for primary prevention ICD.  Discussed this with patient and she is willing to consider.   -- Placing referral to EP for further discussion of ICD  -- Continue current doses of metoprolol, Entresto, spironolactone, and Farxiga  -- Advised daily weights  -- Lasix at least every other day with second dose as needed based on weight and symptoms  -- Will consider repeat echo next visit to assess severity of valvular dysfunction post diuresis as well as EF  -- Check BMP in 2 weeks to monitor electrolytes and renal function with diuretics    3. Prediabetes  A1c is still in the prediabetic range.  Lipids are within acceptable range.  -- Will need to monitor regular labs with primary care    4. Tobacco abuse  Will continue to encourage smoking cessation.              Plan   Orders Placed This Encounter   Procedures    Basic Metabolic Panel     Standing Status:   Future     Standing Expiration Date:   3/4/2025     Order Specific Question:   Release to patient     Answer:   Routine Release [[id number]]    Ambulatory Referral to Cardiac Electrophysiology     Referral Priority:   Routine     Referral Type:   Consultation     Referral Reason:   Specialty Services Required     Requested Specialty:   Cardiac Electrophysiology     Number of Visits Requested:   1     Medications:    Preventative Cardiology:   Tobacco Cessation: Cessation Counseling Provided   Obstructive Sleep Apnea Screening:  Deffered  AAA Screening: Deffered  Peripheral Arterial Disease Screening: Deffered       Follow Up:   Return in about 6 weeks (around 4/15/2024).    Thank you for allowing me to participate in the care of your patient. Please to not hesitate to contact me with additional questions or concerns.     Jack Turpin MD  03/04/2024  08:21 EST

## 2024-03-15 ENCOUNTER — DOCUMENTATION (OUTPATIENT)
Dept: PHYSICAL THERAPY | Facility: HOSPITAL | Age: 59
End: 2024-03-15
Payer: COMMERCIAL

## 2024-03-15 NOTE — THERAPY DISCHARGE NOTE
Outpatient Rehabilitation - Wound/Debridement  D/C Summary         Patient Name: Samara Stringer  : 1965  MRN: [id number]  Today's Date: 3/15/2024                  Admit Date: (Not on file)    Visit Dx:  No diagnosis found.    Patient Active Problem List   Diagnosis    Acute on chronic heart failure    Tobacco abuse    Pulmonary emphysema    Prediabetes    Bilateral leg edema    Chronic HFrEF (heart failure with reduced ejection fraction)    Cardiomyopathy        Past Medical History:   Diagnosis Date    COPD (chronic obstructive pulmonary disease)         Past Surgical History:   Procedure Laterality Date    CARDIAC CATHETERIZATION N/A 2024    Procedure: Coronary angiography;  Surgeon: David Grande MD;  Location: [facility] CATH INVASIVE LOCATION;  Service: Cardiology;  Laterality: N/A;    LEG SURGERY Left     TUBAL ABDOMINAL LIGATION           EVALUATION                WOUND DEBRIDEMENT                            Recommendation and Plan      Goals   PT OP Goals       Row Name 03/15/24 1100          PT Short Term Goals    STG 1 Verbalize s/sx of infection and when to seek medical attention.  -     STG 1 Progress Met  -     STG 2 Demonstrate minimal remaining edema to improve skin integrity and wound healing potential.  -     STG 2 Progress Met  -     STG 3 Decrease area of wounds by 50% as evidence of wound healing.  -     STG 3 Progress Met  -        Long Term Goals    LTG 1 Demonstrate independence with home dressings/compression management.  -     LTG 1 Progress Not Met  -     LTG 2 Decrease area of wounds by 90% as evidence of wound healing.  -     LTG 2 Progress Not Met  -               User Key  (r) = Recorded By, (t) = Taken By, (c) = Cosigned By      Initials Name Provider Type    Janina Conte, PT Physical Therapist                    Time Calculation:               OP Discharge Summary       Row Name 03/15/24 1141             OP PT Discharge Summary     Date of Discharge 03/15/24  -      Reason for Discharge other (comment)  tentative d/c with subsequent NCNS. Pt will now require new referral to resume care.  -MC      Outcomes Achieved Patient able to partially acheive established goals  -      Discharge Destination Home with home program  -                User Key  (r) = Recorded By, (t) = Taken By, (c) = Cosigned By      Initials Name Provider Type    Janina Conte, PT Physical Therapist                    Janina Jarvis, CHETAN  3/15/2024

## 2024-03-19 PROBLEM — I27.20 PULMONARY HYPERTENSION: Status: ACTIVE | Noted: 2024-03-19

## 2024-03-19 PROBLEM — R60.0 BILATERAL LEG EDEMA: Chronic | Status: RESOLVED | Noted: 2024-01-10 | Resolved: 2024-03-19

## 2024-03-19 PROBLEM — I50.9 ACUTE ON CHRONIC HEART FAILURE: Status: RESOLVED | Noted: 2024-01-10 | Resolved: 2024-03-19

## 2024-03-19 PROBLEM — I42.0 DILATED CARDIOMYOPATHY: Status: ACTIVE | Noted: 2024-01-26

## 2024-03-19 PROBLEM — I38 HEART VALVE DISEASE: Status: ACTIVE | Noted: 2024-03-19

## 2024-04-04 ENCOUNTER — TELEPHONE (OUTPATIENT)
Dept: CARDIOLOGY | Facility: CLINIC | Age: 59
End: 2024-04-04

## 2024-04-04 NOTE — TELEPHONE ENCOUNTER
4/4/24 3:25 PM, CALLED PT TO R/S APPT, NO ANSWER, VM FULL, SENT [employer] MESSAGE TO PATIENT WITH MY DIRECT LINE TO CONTACT TO SCHEDULE, LG

## 2024-04-04 NOTE — TELEPHONE ENCOUNTER
"    Caller: Samara Stringer \"SAMARA AGUIRRE\"    Relationship to patient: Self    Best call back number: [phone]    Chief complaint: PT WAS UNABLE TO MAKE HER APPOINTMENT ON 03.25.24. SHE WOULD LIKE TO RESCHEDULE. [facility] HAS NO AVAILABILITY IN THE ALLOWED TIMEFRAME. PLEASE REACH OUT TO PT TO SCHEDULE.     Type of visit: NEW PATIENT    Requested date: ASAP    If rescheduling, when is the original appointment: 03.25.24      "

## 2024-04-09 DIAGNOSIS — I50.9 ACUTE ON CHRONIC HEART FAILURE, UNSPECIFIED HEART FAILURE TYPE: Primary | ICD-10-CM

## 2024-04-09 RX ORDER — SPIRONOLACTONE 25 MG/1
25 TABLET ORAL DAILY
Qty: 90 TABLET | Refills: 3 | Status: SHIPPED | OUTPATIENT
Start: 2024-04-09

## 2024-04-09 RX ORDER — DAPAGLIFLOZIN 10 MG/1
10 TABLET, FILM COATED ORAL DAILY
Qty: 30 TABLET | Refills: 5 | Status: SHIPPED | OUTPATIENT
Start: 2024-04-09 | End: 2024-10-06

## 2024-04-12 ENCOUNTER — LAB (OUTPATIENT)
Dept: LAB | Facility: HOSPITAL | Age: 59
End: 2024-04-12
Payer: COMMERCIAL

## 2024-04-12 DIAGNOSIS — I50.22 CHRONIC HFREF (HEART FAILURE WITH REDUCED EJECTION FRACTION): Chronic | ICD-10-CM

## 2024-04-12 LAB
ANION GAP SERPL CALCULATED.3IONS-SCNC: 11.8 MMOL/L (ref 5–15)
BUN SERPL-MCNC: 31 MG/DL (ref 6–20)
BUN/CREAT SERPL: 26.7 (ref 7–25)
CALCIUM SPEC-SCNC: 9.8 MG/DL (ref 8.6–10.5)
CHLORIDE SERPL-SCNC: 98 MMOL/L (ref 98–107)
CO2 SERPL-SCNC: 27.2 MMOL/L (ref 22–29)
CREAT SERPL-MCNC: 1.16 MG/DL (ref 0.57–1)
EGFRCR SERPLBLD CKD-EPI 2021: 55.1 ML/MIN/1.73
GLUCOSE SERPL-MCNC: 90 MG/DL (ref 65–99)
POTASSIUM SERPL-SCNC: 4.8 MMOL/L (ref 3.5–5.2)
SODIUM SERPL-SCNC: 137 MMOL/L (ref 136–145)

## 2024-04-12 PROCEDURE — 80048 BASIC METABOLIC PNL TOTAL CA: CPT

## 2024-04-12 PROCEDURE — 36415 COLL VENOUS BLD VENIPUNCTURE: CPT

## 2024-04-12 NOTE — PROGRESS NOTES
"    [facility] Cardiology Office Follow Up Note    Samara Stringer  [id number]  2024    Primary Care Provider: Provider, No Known    Chief Complaint   Patient presents with    Congestive Heart Failure       Subjective     History of Present Illness:   Samara Stringer is a 58 y.o. female with chronic HFrEF, NICM, prediabetes, and tobacco abuse   who presents to the Cardiology Clinic for follow up of heart failure.  During her last visit, we referred her to EP for ICD evaluation but patient never made the appointment due to a death in the family.  Overall, says she feels well and has no acute complaints.  Weight has been stable around 140-145.  No orthopnea or PND.  Leg swelling is well-controlled.  Currently takes Lasix about every other day.  Is compliant with all of her other medications.  Does get a little dizzy with lower blood pressure readings.  Blood pressure log shows some systolics in the 80s at times.    Past Cardiac Testin. Last Coronary Angio: 24  Angiographically \"near-normal\" coronary arteries  Nonischemic dilated cardiomyopathy    2. Last Echo: 24    Left ventricular systolic function is moderately decreased. Calculated left ventricular 3D EF = 31% Left ventricular ejection fraction appears to be 31 - 35%.    The left ventricular cavity is moderately dilated. Left ventricular wall thickness is consistent with mild concentric hypertrophy.    Grade II diastolic dysfunction.    The right ventricular cavity is mildly dilated.  Borderline right ventricular systolic function.    Moderate biatrial dilation.    Moderate mitral valve regurgitation is present, eccentric and directed posteriorly.  Eccentricity of the regurgitant jet may underestimate severity of regurgitation.    Moderate to severe tricuspid valve regurgitation is present.    Estimated right ventricular systolic pressure from tricuspid regurgitation is moderately elevated (45-55 mmHg). Calculated right " ventricular systolic pressure from tricuspid regurgitation is 49 mmHg.    There is a trivial circumferential pericardial effusion.     3. Prior Stress Testing: none    4. Prior Holter Monitor: none    Review of Systems:  Review of Systems   Constitutional: Negative.    Respiratory: Negative.     Cardiovascular: Negative.    Gastrointestinal: Negative.    Musculoskeletal: Negative.    Neurological:  Positive for dizziness. Negative for syncope.       I have reviewed and/or updated the patient's past medical, past surgical, family, social history, problem list and allergies as appropriate.       Current Outpatient Medications:     albuterol (ACCUNEB) 0.63 MG/3ML nebulizer solution, Take 3 mL by nebulization Every 6 (Six) Hours As Needed for Wheezing., Disp: 20 each, Rfl: 0    albuterol sulfate  (90 Base) MCG/ACT inhaler, Inhale 2 puffs Every 4 (Four) Hours As Needed for Wheezing., Disp: 17 g, Rfl: 0    aspirin 81 MG EC tablet, Take 1 tablet by mouth Daily., Disp: , Rfl:     budesonide-formoterol (SYMBICORT) 160-4.5 MCG/ACT inhaler, Inhale 2 puffs 2 (Two) Times a Day. Pt takes prn, Disp: , Rfl:     dapagliflozin Propanediol (Farxiga) 10 MG tablet, Take 10 mg by mouth Daily for 180 days., Disp: 30 tablet, Rfl: 5    furosemide (LASIX) 40 MG tablet, Take 1 tablet by mouth Daily., Disp: 30 tablet, Rfl: 2    loratadine (CLARITIN) 10 MG tablet, Take 1 tablet by mouth Daily., Disp: , Rfl:     metoprolol succinate XL (TOPROL-XL) 100 MG 24 hr tablet, Take 1 tablet by mouth Daily., Disp: 30 tablet, Rfl: 3    rosuvastatin (CRESTOR) 10 MG tablet, Take 1 tablet by mouth Daily., Disp: , Rfl:     sacubitril-valsartan (Entresto)  MG tablet, Take 1 tablet by mouth 2 (Two) Times a Day., Disp: , Rfl:     spironolactone (ALDACTONE) 25 MG tablet, Take 1 tablet by mouth Daily., Disp: 90 tablet, Rfl: 3    doxycycline (MONODOX) 100 MG capsule, Take 1 capsule by mouth 2 (Two) Times a Day. (Patient not taking: Reported on  "1/26/2024), Disp: 20 capsule, Rfl: 0    escitalopram (LEXAPRO) 10 MG tablet, Take 1 tablet by mouth Daily. (Patient not taking: Reported on 4/16/2024), Disp: , Rfl:     meloxicam (MOBIC) 7.5 MG tablet, Take 1 tablet by mouth Daily. (Patient not taking: Reported on 3/4/2024), Disp: 14 tablet, Rfl: 0    predniSONE (DELTASONE) 20 MG tablet, Take 1 tablet by mouth 2 (Two) Times a Day. (Patient not taking: Reported on 1/26/2024), Disp: 10 tablet, Rfl: 0       Objective     Vitals:  Vitals:    04/16/24 1006   BP: 102/68   BP Location: Right arm   Patient Position: Lying   Cuff Size: Adult   Pulse: 76   Resp: 18   SpO2: 96%   Weight: 63.8 kg (140 lb 9.6 oz)   Height: 167.6 cm (66\")       Physical Exam:  Vitals reviewed.   Constitutional:       Appearance: Healthy appearance. Not in distress.   Neck:      Vascular: No JVD.   Pulmonary:      Effort: Pulmonary effort is normal.      Breath sounds: Normal breath sounds.   Cardiovascular:      Normal rate. Regular rhythm. Normal S1. Normal S2.       Murmurs: There is no murmur.      No gallop.  No click. No rub.   Pulses:     Intact distal pulses.   Edema:     Peripheral edema absent.   Abdominal:      General: There is no distension.      Palpations: Abdomen is soft.      Tenderness: There is no abdominal tenderness.   Skin:     General: Skin is warm and dry.         Results Review:   I reviewed the patient's new clinical results.  I reviewed the patient's other test results and agree with the interpretation    Procedures        Assessment & Plan   Diagnoses and all orders for this visit:    1. Chronic HFrEF (heart failure with reduced ejection fraction) (Primary)  2. Dilated cardiomyopathy  Nonischemic, dilated cardiomyopathy.  Nonobstructive CAD on recent angiography.  EF 30% (down from 39% about 2 years ago). Grade II diastolic dysfunction, moderate to severe valvular regurgitation, RVSP 50.  NYHA class II, stage C  Dry weight 140-145  Stable, compensated, euvolemic on exam " today.  Is getting a bit dizzy/orthostatic with low blood pressure readings at home.  -- Advised daily weights and can hold off Lasix when blood pressures are very low.  -- Continue metoprolol, Entresto, spironolactone, and Farxiga.  Further dose titration precluded by hypotension.  -- Strongly encouraged her to reschedule EP appointment for ICD evaluation    3. Heart valve disease  Echo in January 2024 showed moderate MR, eccentric and posterior.  Moderate to severe TR.  Was acutely decompensated at that point.  Suspect that these have improved with diuresis.  No significant murmur on exam today.  -- May consider limited echo next visit to reassess severity    4. Stage 3a chronic kidney disease  GFR 55 on recent BMP.  Baseline creatinine around 1.2.  Does not see nephrology.  -- Will need at least biannual BMP while on heart failure medications and diuretics.  Will defer consideration of nephrology referral to primary care          Plan   No orders of the defined types were placed in this encounter.    Medications:    Preventative Cardiology:   Tobacco Cessation:  Deferred  Obstructive Sleep Apnea Screening: Deffered  AAA Screening: Deffered  Peripheral Arterial Disease Screening: Deffered       Follow Up:   Return in about 2 months (around 6/16/2024).    Thank you for allowing me to participate in the care of your patient. Please to not hesitate to contact me with additional questions or concerns.     Jack Turpin MD  04/16/2024  11:18 EDT

## 2024-04-16 ENCOUNTER — OFFICE VISIT (OUTPATIENT)
Dept: CARDIOLOGY | Facility: CLINIC | Age: 59
End: 2024-04-16
Payer: COMMERCIAL

## 2024-04-16 VITALS
HEIGHT: 66 IN | WEIGHT: 140.6 LBS | HEART RATE: 76 BPM | BODY MASS INDEX: 22.6 KG/M2 | RESPIRATION RATE: 18 BRPM | SYSTOLIC BLOOD PRESSURE: 102 MMHG | OXYGEN SATURATION: 96 % | DIASTOLIC BLOOD PRESSURE: 68 MMHG

## 2024-04-16 DIAGNOSIS — N18.31 STAGE 3A CHRONIC KIDNEY DISEASE: Chronic | ICD-10-CM

## 2024-04-16 DIAGNOSIS — I42.0 DILATED CARDIOMYOPATHY: Chronic | ICD-10-CM

## 2024-04-16 DIAGNOSIS — I50.22 CHRONIC HFREF (HEART FAILURE WITH REDUCED EJECTION FRACTION): Primary | Chronic | ICD-10-CM

## 2024-04-16 DIAGNOSIS — I38 HEART VALVE DISEASE: Chronic | ICD-10-CM

## 2024-04-16 PROCEDURE — 99214 OFFICE O/P EST MOD 30 MIN: CPT | Performed by: INTERNAL MEDICINE

## 2024-04-16 RX ORDER — LORATADINE 10 MG/1
10 TABLET ORAL DAILY
COMMUNITY

## 2024-04-16 RX ORDER — ASPIRIN 81 MG/1
81 TABLET ORAL DAILY
COMMUNITY

## 2024-05-31 ENCOUNTER — TELEPHONE (OUTPATIENT)
Dept: CARDIOLOGY | Facility: CLINIC | Age: 59
End: 2024-05-31

## 2024-05-31 NOTE — H&P (VIEW-ONLY)
Cardiac Electrophysiology Outpatient Consult Note            [facility] at [facility]    Consult Note     Samara Stringer  [id number]  06/03[birth year]2024  [phone]     Primary Care Physician: Provider, No Known    Referred By: Jack Turpin MD    Subjective     Chief Complaint:   Diagnoses and all orders for this visit:    1. Dilated cardiomyopathy (Primary)  -     Case Request EP Lab: S-ICD, no meds to hold, nerve block, no GA    2. Chronic HFrEF (heart failure with reduced ejection fraction)      Chief Complaint   Patient presents with    Chronic HFrEF (heart failure with reduced ejection fraction)       History of Present Illness:   Samara Stringer is a 58 y.o. female who presents to my electrophysiology clinic for evaluation of above complaints.  Here to discuss candidacy for an ICD    Past Medical History:   Patient Active Problem List    Diagnosis Date Noted    Stage 3a chronic kidney disease 04/16/2024    Heart valve disease 03/19/2024     Note Last Updated: 3/19/2024     Echo, 10/27/2022: EF 39%.  Moderate to severe global RV hypokinesis.  Heavily calcified mitral annulus and mitral leaflet especially posterior with reduced excursion.  Echo, 1/18/2024: EF 30-35%.  Grade 2 diastolic dysfunction.  Moderate MR.  Moderate to severe TR.  RVSP 45-55 mmHg      Pulmonary hypertension 03/19/2024     Note Last Updated: 3/19/2024     Echo, 1/18/2024: EF 30-35%.  Grade 2 diastolic dysfunction.  Moderate MR.  Moderate to severe TR.  RVSP 45-55 mmHg      Chronic HFrEF (heart failure with reduced ejection fraction) 01/26/2024    Dilated cardiomyopathy 01/26/2024     Note Last Updated: 3/19/2024     Echo, 1/18/2024: EF 30-35%.  Grade 2 diastolic dysfunction.  Moderate MR.  Moderate to severe TR.  RVSP 45-55 mmHg  [facility], 2/1/2024: Near normal coronary arteries      Tobacco abuse 01/10/2024    Pulmonary emphysema 01/10/2024    Prediabetes 01/10/2024       Past Surgical History:   Past  Surgical History:   Procedure Laterality Date    CARDIAC CATHETERIZATION N/A 2/1/2024    Procedure: Coronary angiography;  Surgeon: David Grande MD;  Location: [facility] CATH INVASIVE LOCATION;  Service: Cardiology;  Laterality: N/A;    LEG SURGERY Left 1998    TUBAL ABDOMINAL LIGATION         Social History:   Social History     Socioeconomic History    Marital status: Legally    Tobacco Use    Smoking status: Every Day     Current packs/day: 0.50     Types: Cigarettes     Passive exposure: Current    Smokeless tobacco: Never   Vaping Use    Vaping status: Some Days    Substances: Nicotine, Flavoring    Devices: Disposable   Substance and Sexual Activity    Alcohol use: Never    Drug use: Not Currently     Comment: neurontin; marijuana    Sexual activity: Defer       Medications:     Current Outpatient Medications:     albuterol (ACCUNEB) 0.63 MG/3ML nebulizer solution, Take 3 mL by nebulization Every 6 (Six) Hours As Needed for Wheezing., Disp: 20 each, Rfl: 0    albuterol sulfate  (90 Base) MCG/ACT inhaler, Inhale 2 puffs Every 4 (Four) Hours As Needed for Wheezing., Disp: 17 g, Rfl: 0    aspirin 81 MG EC tablet, Take 1 tablet by mouth Daily., Disp: , Rfl:     budesonide-formoterol (SYMBICORT) 160-4.5 MCG/ACT inhaler, Inhale 2 puffs 2 (Two) Times a Day. Pt takes prn, Disp: , Rfl:     dapagliflozin Propanediol (Farxiga) 10 MG tablet, Take 10 mg by mouth Daily for 180 days., Disp: 30 tablet, Rfl: 5    escitalopram (LEXAPRO) 10 MG tablet, Take 1 tablet by mouth Daily., Disp: , Rfl:     furosemide (LASIX) 40 MG tablet, Take 1 tablet by mouth Daily., Disp: 30 tablet, Rfl: 2    loratadine (CLARITIN) 10 MG tablet, Take 1 tablet by mouth Daily., Disp: , Rfl:     meloxicam (MOBIC) 7.5 MG tablet, Take 1 tablet by mouth Daily., Disp: 14 tablet, Rfl: 0    metoprolol succinate XL (TOPROL-XL) 100 MG 24 hr tablet, Take 1 tablet by mouth Daily., Disp: 30 tablet, Rfl: 3    rosuvastatin (CRESTOR) 10 MG tablet,  "Take 1 tablet by mouth Daily., Disp: , Rfl:     sacubitril-valsartan (Entresto)  MG tablet, Take 1 tablet by mouth 2 (Two) Times a Day., Disp: , Rfl:     spironolactone (ALDACTONE) 25 MG tablet, Take 1 tablet by mouth Daily., Disp: 90 tablet, Rfl: 3    Allergies:   Allergies   Allergen Reactions    Amoxicillin Hives    Codeine Swelling       Objective   Vital Signs:   Vitals:    06/03/24 0857   BP: 142/82   BP Location: Left arm   Patient Position: Sitting   Pulse: 83   SpO2: 98%   Weight: 70.5 kg (155 lb 6.4 oz)   Height: 167.6 cm (66\")       PHYSICAL EXAM    Neck: no adenopathy, no carotid bruit, no JVD, and thyroid: not enlarged  Lungs: clear to auscultation bilaterally and no rhonchi or crackles\", ' symmetric  Heart: regular rate and rhythm, S1, S2 normal, no murmur, click, rub or gallop  Abdomen: Soft, non-tender, bowel sounds normal,  no organomegaly  Extremities: extremities normal, atraumatic, no cyanosis or edema      Lab Results   Component Value Date    GLUCOSE 90 04/12/2024    CALCIUM 9.8 04/12/2024     04/12/2024    K 4.8 04/12/2024    CO2 27.2 04/12/2024    CL 98 04/12/2024    BUN 31 (H) 04/12/2024    CREATININE 1.16 (H) 04/12/2024    EGFRIFNONA 39 (L) 01/21/2020    BCR 26.7 (H) 04/12/2024    ANIONGAP 11.8 04/12/2024     Lab Results   Component Value Date    WBC 6.76 02/01/2024    HGB 14.2 02/01/2024    HCT 44.6 02/01/2024    MCV 98.5 (H) 02/01/2024     02/01/2024     No results found for: \"INR\", \"PROTIME\"  Lab Results   Component Value Date    TSH 2.690 10/26/2022       Cardiac Testing:      I personally viewed and interpreted the patient's EKG/Telemetry/lab data    Procedures    Tobacco Cessation: Cessation Counseling Provided   Obstructive Sleep Apnea Screening: Completed    Assessment & Plan    Diagnoses and all orders for this visit:    1. Dilated cardiomyopathy (Primary)  -     Case Request EP Lab: S-ICD, no meds to hold, nerve block, no GA    2. Chronic HFrEF (heart failure " with reduced ejection fraction)         Diagnosis Plan   1. Dilated cardiomyopathy  Patient referred by her cardiologist for consideration for defibrillator for the primary prevention of sudden cardiac death.    Patient has had heart failure for some time now.  She has been on guideline directed medical therapy extensively.    She takes care of her 3-year-old grandchild predominantly by herself.  She has done some reading and wishes to proceed with a defibrillator.  We reviewed the option of a transvenous ICD versus a totally subcutaneous ICD system.  She has no indication for pacing.  She is a diabetic.  She is a vasculopath.  She is at increased risk of infection.  I think she would be far better served by an S ICD.  This would be safer for her both acutely as well as in the long run.  She has no indication for pacing for either bradycardia or CRT.      Please especially note that the patient has been on maximally tolerated doses of guideline directed medical therapy, including but not limited to: HF indicated beta-blocker (carvedilol, metoprolol succinate), ACE Inhibitor or Angiotensin receptor blocker.  Please note that for some of these medications the maximally tolerated dose may be zero.  The patient has not had a myocardial infarction within 40 days and has not had coronary revascularization within 90 days.    This patient who is a candidate for an ICD has a life expectancy greater than 12 months.    Case Request EP Lab: S-ICD, no meds to hold, nerve block, no GA    The patient and I made a mutually shared decision ( shared decision making model ) that the patient would be best served by proceeding with the above invasive heart procedure.  This is a high risk invasive cardiac procedure which comes with significant risk of morbidity and mortality.  This patient has significant underlying  heart disease.  Samara Stringer understands these risks and   has made an informed decision to proceed.        2.  Chronic HFrEF (heart failure with reduced ejection fraction)  Stable.  Euvolemic.        Body mass index is 25.08 kg/m².    I spent 49 minutes in consultation with this patient which included more than 65% of this time in direct face-to-face counseling, physical examination and discussion of my assessment and findings and this shared decision making with the patient.  The remainder of the time not spent face-to-face was performing one, some or all of the following actions: preparing to see the patient (e.g. reviewing tests, prior clinicians' notes, etc), ordering medications, tests or procedures, coordination of care, discussion of the plan with other healthcare providers, documenting clinical information in epic as well as independently interpreting results and communication of these results to the patient family and/or caregiver(s).  Please note that this explicitly excludes time spent on other separate billable services such as performing procedures or test interpretation, when applicable.    Follow Up:       Thank you for allowing me to participate in the care of your patient. Please to not hesitate to contact me with additional questions or concerns.      Shivam Valdovinos DO, FACC, RS  Cardiac Electrophysiologist  [facility] / [facility]          Electronically signed by SHANTEL Casanova, 05/31/24, 10:03 AM EDT.

## 2024-05-31 NOTE — PROGRESS NOTES
Cardiac Electrophysiology Outpatient Consult Note            [facility] at [facility]    Consult Note     Samara Stringer  [id number]  06/03[birth year]2024  [phone]     Primary Care Physician: Provider, No Known    Referred By: Jack Turpin MD    Subjective     Chief Complaint:   Diagnoses and all orders for this visit:    1. Dilated cardiomyopathy (Primary)  -     Case Request EP Lab: S-ICD, no meds to hold, nerve block, no GA    2. Chronic HFrEF (heart failure with reduced ejection fraction)      Chief Complaint   Patient presents with    Chronic HFrEF (heart failure with reduced ejection fraction)       History of Present Illness:   Samara Stringer is a 58 y.o. female who presents to my electrophysiology clinic for evaluation of above complaints.  Here to discuss candidacy for an ICD    Past Medical History:   Patient Active Problem List    Diagnosis Date Noted    Stage 3a chronic kidney disease 04/16/2024    Heart valve disease 03/19/2024     Note Last Updated: 3/19/2024     Echo, 10/27/2022: EF 39%.  Moderate to severe global RV hypokinesis.  Heavily calcified mitral annulus and mitral leaflet especially posterior with reduced excursion.  Echo, 1/18/2024: EF 30-35%.  Grade 2 diastolic dysfunction.  Moderate MR.  Moderate to severe TR.  RVSP 45-55 mmHg      Pulmonary hypertension 03/19/2024     Note Last Updated: 3/19/2024     Echo, 1/18/2024: EF 30-35%.  Grade 2 diastolic dysfunction.  Moderate MR.  Moderate to severe TR.  RVSP 45-55 mmHg      Chronic HFrEF (heart failure with reduced ejection fraction) 01/26/2024    Dilated cardiomyopathy 01/26/2024     Note Last Updated: 3/19/2024     Echo, 1/18/2024: EF 30-35%.  Grade 2 diastolic dysfunction.  Moderate MR.  Moderate to severe TR.  RVSP 45-55 mmHg  [facility], 2/1/2024: Near normal coronary arteries      Tobacco abuse 01/10/2024    Pulmonary emphysema 01/10/2024    Prediabetes 01/10/2024       Past Surgical History:   Past  Surgical History:   Procedure Laterality Date    CARDIAC CATHETERIZATION N/A 2/1/2024    Procedure: Coronary angiography;  Surgeon: David Grande MD;  Location: [facility] CATH INVASIVE LOCATION;  Service: Cardiology;  Laterality: N/A;    LEG SURGERY Left 1998    TUBAL ABDOMINAL LIGATION         Social History:   Social History     Socioeconomic History    Marital status: Legally    Tobacco Use    Smoking status: Every Day     Current packs/day: 0.50     Types: Cigarettes     Passive exposure: Current    Smokeless tobacco: Never   Vaping Use    Vaping status: Some Days    Substances: Nicotine, Flavoring    Devices: Disposable   Substance and Sexual Activity    Alcohol use: Never    Drug use: Not Currently     Comment: neurontin; marijuana    Sexual activity: Defer       Medications:     Current Outpatient Medications:     albuterol (ACCUNEB) 0.63 MG/3ML nebulizer solution, Take 3 mL by nebulization Every 6 (Six) Hours As Needed for Wheezing., Disp: 20 each, Rfl: 0    albuterol sulfate  (90 Base) MCG/ACT inhaler, Inhale 2 puffs Every 4 (Four) Hours As Needed for Wheezing., Disp: 17 g, Rfl: 0    aspirin 81 MG EC tablet, Take 1 tablet by mouth Daily., Disp: , Rfl:     budesonide-formoterol (SYMBICORT) 160-4.5 MCG/ACT inhaler, Inhale 2 puffs 2 (Two) Times a Day. Pt takes prn, Disp: , Rfl:     dapagliflozin Propanediol (Farxiga) 10 MG tablet, Take 10 mg by mouth Daily for 180 days., Disp: 30 tablet, Rfl: 5    escitalopram (LEXAPRO) 10 MG tablet, Take 1 tablet by mouth Daily., Disp: , Rfl:     furosemide (LASIX) 40 MG tablet, Take 1 tablet by mouth Daily., Disp: 30 tablet, Rfl: 2    loratadine (CLARITIN) 10 MG tablet, Take 1 tablet by mouth Daily., Disp: , Rfl:     meloxicam (MOBIC) 7.5 MG tablet, Take 1 tablet by mouth Daily., Disp: 14 tablet, Rfl: 0    metoprolol succinate XL (TOPROL-XL) 100 MG 24 hr tablet, Take 1 tablet by mouth Daily., Disp: 30 tablet, Rfl: 3    rosuvastatin (CRESTOR) 10 MG tablet,  "Take 1 tablet by mouth Daily., Disp: , Rfl:     sacubitril-valsartan (Entresto)  MG tablet, Take 1 tablet by mouth 2 (Two) Times a Day., Disp: , Rfl:     spironolactone (ALDACTONE) 25 MG tablet, Take 1 tablet by mouth Daily., Disp: 90 tablet, Rfl: 3    Allergies:   Allergies   Allergen Reactions    Amoxicillin Hives    Codeine Swelling       Objective   Vital Signs:   Vitals:    06/03/24 0857   BP: 142/82   BP Location: Left arm   Patient Position: Sitting   Pulse: 83   SpO2: 98%   Weight: 70.5 kg (155 lb 6.4 oz)   Height: 167.6 cm (66\")       PHYSICAL EXAM    Neck: no adenopathy, no carotid bruit, no JVD, and thyroid: not enlarged  Lungs: clear to auscultation bilaterally and no rhonchi or crackles\", ' symmetric  Heart: regular rate and rhythm, S1, S2 normal, no murmur, click, rub or gallop  Abdomen: Soft, non-tender, bowel sounds normal,  no organomegaly  Extremities: extremities normal, atraumatic, no cyanosis or edema      Lab Results   Component Value Date    GLUCOSE 90 04/12/2024    CALCIUM 9.8 04/12/2024     04/12/2024    K 4.8 04/12/2024    CO2 27.2 04/12/2024    CL 98 04/12/2024    BUN 31 (H) 04/12/2024    CREATININE 1.16 (H) 04/12/2024    EGFRIFNONA 39 (L) 01/21/2020    BCR 26.7 (H) 04/12/2024    ANIONGAP 11.8 04/12/2024     Lab Results   Component Value Date    WBC 6.76 02/01/2024    HGB 14.2 02/01/2024    HCT 44.6 02/01/2024    MCV 98.5 (H) 02/01/2024     02/01/2024     No results found for: \"INR\", \"PROTIME\"  Lab Results   Component Value Date    TSH 2.690 10/26/2022       Cardiac Testing:      I personally viewed and interpreted the patient's EKG/Telemetry/lab data    Procedures    Tobacco Cessation: Cessation Counseling Provided   Obstructive Sleep Apnea Screening: Completed    Assessment & Plan    Diagnoses and all orders for this visit:    1. Dilated cardiomyopathy (Primary)  -     Case Request EP Lab: S-ICD, no meds to hold, nerve block, no GA    2. Chronic HFrEF (heart failure " with reduced ejection fraction)         Diagnosis Plan   1. Dilated cardiomyopathy  Patient referred by her cardiologist for consideration for defibrillator for the primary prevention of sudden cardiac death.    Patient has had heart failure for some time now.  She has been on guideline directed medical therapy extensively.    She takes care of her 3-year-old grandchild predominantly by herself.  She has done some reading and wishes to proceed with a defibrillator.  We reviewed the option of a transvenous ICD versus a totally subcutaneous ICD system.  She has no indication for pacing.  She is a diabetic.  She is a vasculopath.  She is at increased risk of infection.  I think she would be far better served by an S ICD.  This would be safer for her both acutely as well as in the long run.  She has no indication for pacing for either bradycardia or CRT.      Please especially note that the patient has been on maximally tolerated doses of guideline directed medical therapy, including but not limited to: HF indicated beta-blocker (carvedilol, metoprolol succinate), ACE Inhibitor or Angiotensin receptor blocker.  Please note that for some of these medications the maximally tolerated dose may be zero.  The patient has not had a myocardial infarction within 40 days and has not had coronary revascularization within 90 days.    This patient who is a candidate for an ICD has a life expectancy greater than 12 months.    Case Request EP Lab: S-ICD, no meds to hold, nerve block, no GA    The patient and I made a mutually shared decision ( shared decision making model ) that the patient would be best served by proceeding with the above invasive heart procedure.  This is a high risk invasive cardiac procedure which comes with significant risk of morbidity and mortality.  This patient has significant underlying  heart disease.  Samara Stringer understands these risks and   has made an informed decision to proceed.        2.  Chronic HFrEF (heart failure with reduced ejection fraction)  Stable.  Euvolemic.        Body mass index is 25.08 kg/m².    I spent 49 minutes in consultation with this patient which included more than 65% of this time in direct face-to-face counseling, physical examination and discussion of my assessment and findings and this shared decision making with the patient.  The remainder of the time not spent face-to-face was performing one, some or all of the following actions: preparing to see the patient (e.g. reviewing tests, prior clinicians' notes, etc), ordering medications, tests or procedures, coordination of care, discussion of the plan with other healthcare providers, documenting clinical information in epic as well as independently interpreting results and communication of these results to the patient family and/or caregiver(s).  Please note that this explicitly excludes time spent on other separate billable services such as performing procedures or test interpretation, when applicable.    Follow Up:       Thank you for allowing me to participate in the care of your patient. Please to not hesitate to contact me with additional questions or concerns.      Shivam Valdovinos DO, FACC, RS  Cardiac Electrophysiologist  [facility] / [facility]          Electronically signed by SHANTEL Casanova, 05/31/24, 10:03 AM EDT.

## 2024-05-31 NOTE — TELEPHONE ENCOUNTER
Patient called regarding samples for her Entresto . Number given on VM was [phone]. I attempted to contact patient with no answer or VM available.

## 2024-06-03 ENCOUNTER — OFFICE VISIT (OUTPATIENT)
Dept: CARDIOLOGY | Facility: CLINIC | Age: 59
End: 2024-06-03

## 2024-06-03 VITALS
SYSTOLIC BLOOD PRESSURE: 142 MMHG | HEIGHT: 66 IN | BODY MASS INDEX: 24.98 KG/M2 | HEART RATE: 83 BPM | WEIGHT: 155.4 LBS | OXYGEN SATURATION: 98 % | DIASTOLIC BLOOD PRESSURE: 82 MMHG

## 2024-06-03 DIAGNOSIS — I50.22 CHRONIC HFREF (HEART FAILURE WITH REDUCED EJECTION FRACTION): Chronic | ICD-10-CM

## 2024-06-03 DIAGNOSIS — I42.0 DILATED CARDIOMYOPATHY: Primary | ICD-10-CM

## 2024-06-04 ENCOUNTER — PREP FOR SURGERY (OUTPATIENT)
Dept: OTHER | Facility: HOSPITAL | Age: 59
End: 2024-06-04

## 2024-06-04 DIAGNOSIS — I50.22 CHRONIC HFREF (HEART FAILURE WITH REDUCED EJECTION FRACTION): Primary | Chronic | ICD-10-CM

## 2024-06-04 DIAGNOSIS — I42.0 DILATED CARDIOMYOPATHY: ICD-10-CM

## 2024-06-04 RX ORDER — ONDANSETRON 2 MG/ML
4 INJECTION INTRAMUSCULAR; INTRAVENOUS EVERY 6 HOURS PRN
OUTPATIENT
Start: 2024-06-04

## 2024-06-04 RX ORDER — SODIUM CHLORIDE 9 MG/ML
40 INJECTION, SOLUTION INTRAVENOUS AS NEEDED
OUTPATIENT
Start: 2024-06-04

## 2024-06-04 RX ORDER — NITROGLYCERIN 0.4 MG/1
0.4 TABLET SUBLINGUAL
OUTPATIENT
Start: 2024-06-04

## 2024-06-04 RX ORDER — SODIUM CHLORIDE 0.9 % (FLUSH) 0.9 %
3 SYRINGE (ML) INJECTION EVERY 12 HOURS SCHEDULED
OUTPATIENT
Start: 2024-06-04

## 2024-06-04 RX ORDER — ACETAMINOPHEN 325 MG/1
650 TABLET ORAL EVERY 4 HOURS PRN
OUTPATIENT
Start: 2024-06-04

## 2024-06-04 RX ORDER — SODIUM CHLORIDE 0.9 % (FLUSH) 0.9 %
10 SYRINGE (ML) INJECTION AS NEEDED
OUTPATIENT
Start: 2024-06-04

## 2024-06-14 NOTE — PROGRESS NOTES
"    [facility] Cardiology Office Follow Up Note    Samara Stringer  [id number]  2024    Primary Care Provider: Provider, No Known    Chief Complaint   Patient presents with    Follow-up    Congestive Heart Failure       Subjective     History of Present Illness:   Samara Stringer is a 58 y.o. female with chronic HFrEF, NICM, prediabetes, and tobacco abuse who presents to the Cardiology Clinic for follow up of Heart Failure.  Since her last visit, she was seen by Dr. Valdovinos in EP clinic and is going to have her ICD implanted tomorrow.  She is accompanied by her son and daughter to the appointment today.  Says she is feeling so much better compared to her initial visit.  Leg swelling has gone down.  Weight has been pretty stable around 145-150.  She takes Lasix about every other day.  Denies any dizziness or lightheadedness with her current medications.    Past Cardiac Testin. Last Coronary Angio: 24  Angiographically \"near-normal\" coronary arteries  Nonischemic dilated cardiomyopathy     2. Last Echo: 24    Left ventricular systolic function is moderately decreased. Calculated left ventricular 3D EF = 31% Left ventricular ejection fraction appears to be 31 - 35%.    The left ventricular cavity is moderately dilated. Left ventricular wall thickness is consistent with mild concentric hypertrophy.    Grade II diastolic dysfunction.    The right ventricular cavity is mildly dilated.  Borderline right ventricular systolic function.    Moderate biatrial dilation.    Moderate mitral valve regurgitation is present, eccentric and directed posteriorly.  Eccentricity of the regurgitant jet may underestimate severity of regurgitation.    Moderate to severe tricuspid valve regurgitation is present.    Estimated right ventricular systolic pressure from tricuspid regurgitation is moderately elevated (45-55 mmHg). Calculated right ventricular systolic pressure from tricuspid regurgitation is 49 " mmHg.    There is a trivial circumferential pericardial effusion.     3. Prior Stress Testing: none     4. Prior Holter Monitor: none    Review of Systems:  Review of Systems   Constitutional: Negative.    Respiratory: Negative.     Cardiovascular:  Positive for leg swelling. Negative for chest pain and palpitations.   Gastrointestinal: Negative.    Musculoskeletal: Negative.    Neurological: Negative.        I have reviewed and/or updated the patient's past medical, past surgical, family, social history, problem list and allergies as appropriate.       Current Outpatient Medications:     albuterol (ACCUNEB) 0.63 MG/3ML nebulizer solution, Take 3 mL by nebulization Every 6 (Six) Hours As Needed for Wheezing., Disp: 20 each, Rfl: 0    albuterol sulfate  (90 Base) MCG/ACT inhaler, Inhale 2 puffs Every 4 (Four) Hours As Needed for Wheezing., Disp: 17 g, Rfl: 0    aspirin 81 MG EC tablet, Take 1 tablet by mouth Daily., Disp: , Rfl:     dapagliflozin Propanediol (Farxiga) 10 MG tablet, Take 10 mg by mouth Daily for 180 days., Disp: 30 tablet, Rfl: 5    furosemide (LASIX) 40 MG tablet, Take 1 tablet by mouth Daily., Disp: 30 tablet, Rfl: 2    loratadine (CLARITIN) 10 MG tablet, Take 1 tablet by mouth Daily., Disp: , Rfl:     metoprolol succinate XL (TOPROL-XL) 100 MG 24 hr tablet, Take 1 tablet by mouth Daily., Disp: 30 tablet, Rfl: 3    rosuvastatin (CRESTOR) 10 MG tablet, Take 1 tablet by mouth Daily., Disp: , Rfl:     sacubitril-valsartan (Entresto)  MG tablet, Take 1 tablet by mouth 2 (Two) Times a Day., Disp: 60 tablet, Rfl: 11    spironolactone (ALDACTONE) 25 MG tablet, Take 1 tablet by mouth Daily., Disp: 90 tablet, Rfl: 3    budesonide-formoterol (SYMBICORT) 160-4.5 MCG/ACT inhaler, Inhale 2 puffs 2 (Two) Times a Day. Pt takes prn (Patient not taking: Reported on 6/17/2024), Disp: , Rfl:     escitalopram (LEXAPRO) 10 MG tablet, Take 1 tablet by mouth Daily. (Patient not taking: Reported on 6/17/2024),  "Disp: , Rfl:     meloxicam (MOBIC) 7.5 MG tablet, Take 1 tablet by mouth Daily. (Patient not taking: Reported on 6/17/2024), Disp: 14 tablet, Rfl: 0       Objective     Vitals:  Vitals:    06/17/24 0947   BP: 108/62   BP Location: Left arm   Patient Position: Sitting   Cuff Size: Adult   Pulse: 78   SpO2: 97%   Weight: 72.6 kg (160 lb)   Height: 167.6 cm (66\")       Physical Exam:  Vitals reviewed.   Constitutional:       Appearance: Healthy appearance. Not in distress.   Neck:      Vascular: No JVD.   Pulmonary:      Effort: Pulmonary effort is normal.      Breath sounds: Normal breath sounds.   Cardiovascular:      Normal rate. Regular rhythm. Normal S1. Normal S2.       Murmurs: There is no murmur.      No gallop.  No click. No rub.   Pulses:     Intact distal pulses.   Edema:     Pretibial: bilateral trace pitting edema of the pretibial area.     Ankle: bilateral trace pitting edema of the ankle.  Abdominal:      General: There is no distension.      Palpations: Abdomen is soft.      Tenderness: There is no abdominal tenderness.   Skin:     General: Skin is warm and dry.         Results Review:   I reviewed the patient's new clinical results.  I reviewed the patient's new imaging results and agree with the interpretation.    Procedures        Assessment & Plan   Diagnoses and all orders for this visit:    1. Chronic HFrEF (heart failure with reduced ejection fraction) (Primary)  2. Dilated cardiomyopathy  Nonischemic, dilated cardiomyopathy.  Nonobstructive CAD on angiography.  EF 30% (9% about 2 years ago). Grade II diastolic dysfunction, moderate to severe valvular regurgitation, RVSP 50.  NYHA class II, stage C  Dry weight stabilizing around 145-150  Compensated and euvolemic on exam.  No orthostasis.  -- Continue Lasix every other day and increase as needed based on weight and symptoms  -- Continue metoprolol, Entresto, spironolactone, and Farxiga.  Further titration precluded by her borderline " hypotension.  -- ICD to be implanted tomorrow.  Will follow-up on labs.    3. Heart valve disease  Echo January 2024: moderate MR, eccentric and posterior. Moderate to severe TR.  Was acutely decompensated at that point.  Suspect improvement with diuresis.    No murmur on exam today.  -- Will consider repeat limited echo after her ICD implantation    4. Stage 3a chronic kidney disease  GFR 55 on BMP.  Baseline Cr 1.2.  Does not see nephrology.  -- Will follow-up on preop BMP.  Will need long-term follow-up in the setting of prediabetes as well.             Plan   No orders of the defined types were placed in this encounter.    Medications:  -     sacubitril-valsartan (Entresto)  MG tablet; Take 1 tablet by mouth 2 (Two) Times a Day.  Dispense: 60 tablet; Refill: 11      Preventative Cardiology:   Tobacco Cessation: N/A  Obstructive Sleep Apnea Screening: N/A  AAA Screening: N/A  Peripheral Arterial Disease Screening: N/A       Follow Up:   Return in about 2 months (around 8/17/2024).    Thank you for allowing me to participate in the care of your patient. Please to not hesitate to contact me with additional questions or concerns.     Jack Turpin MD  06/17/2024  09:09 EDT

## 2024-06-17 ENCOUNTER — ANESTHESIA EVENT (OUTPATIENT)
Dept: CARDIOLOGY | Facility: HOSPITAL | Age: 59
End: 2024-06-17
Payer: COMMERCIAL

## 2024-06-17 ENCOUNTER — OFFICE VISIT (OUTPATIENT)
Dept: CARDIOLOGY | Facility: CLINIC | Age: 59
End: 2024-06-17
Payer: COMMERCIAL

## 2024-06-17 VITALS
HEART RATE: 78 BPM | DIASTOLIC BLOOD PRESSURE: 62 MMHG | BODY MASS INDEX: 25.71 KG/M2 | HEIGHT: 66 IN | WEIGHT: 160 LBS | OXYGEN SATURATION: 97 % | SYSTOLIC BLOOD PRESSURE: 108 MMHG

## 2024-06-17 DIAGNOSIS — N18.31 STAGE 3A CHRONIC KIDNEY DISEASE: Chronic | ICD-10-CM

## 2024-06-17 DIAGNOSIS — I50.22 CHRONIC HFREF (HEART FAILURE WITH REDUCED EJECTION FRACTION): Primary | Chronic | ICD-10-CM

## 2024-06-17 DIAGNOSIS — I38 HEART VALVE DISEASE: Chronic | ICD-10-CM

## 2024-06-17 DIAGNOSIS — I42.0 DILATED CARDIOMYOPATHY: Chronic | ICD-10-CM

## 2024-06-17 PROCEDURE — 99214 OFFICE O/P EST MOD 30 MIN: CPT | Performed by: INTERNAL MEDICINE

## 2024-06-17 RX ORDER — SACUBITRIL AND VALSARTAN 97; 103 MG/1; MG/1
1 TABLET, FILM COATED ORAL 2 TIMES DAILY
Qty: 60 TABLET | Refills: 11 | Status: SHIPPED | OUTPATIENT
Start: 2024-06-17

## 2024-06-18 ENCOUNTER — APPOINTMENT (OUTPATIENT)
Dept: GENERAL RADIOLOGY | Facility: HOSPITAL | Age: 59
End: 2024-06-18
Payer: COMMERCIAL

## 2024-06-18 ENCOUNTER — ANESTHESIA (OUTPATIENT)
Dept: CARDIOLOGY | Facility: HOSPITAL | Age: 59
End: 2024-06-18
Payer: COMMERCIAL

## 2024-06-18 ENCOUNTER — HOSPITAL ENCOUNTER (OUTPATIENT)
Facility: HOSPITAL | Age: 59
Discharge: HOME | End: 2024-06-18
Attending: INTERNAL MEDICINE | Admitting: INTERNAL MEDICINE
Payer: COMMERCIAL

## 2024-06-18 ENCOUNTER — ANESTHESIA EVENT CONVERTED (OUTPATIENT)
Dept: ANESTHESIOLOGY | Facility: HOSPITAL | Age: 59
End: 2024-06-18
Payer: COMMERCIAL

## 2024-06-18 VITALS
DIASTOLIC BLOOD PRESSURE: 65 MMHG | HEIGHT: 66 IN | HEART RATE: 75 BPM | TEMPERATURE: 97.6 F | RESPIRATION RATE: 12 BRPM | OXYGEN SATURATION: 96 % | BODY MASS INDEX: 24.49 KG/M2 | WEIGHT: 152.4 LBS | SYSTOLIC BLOOD PRESSURE: 112 MMHG

## 2024-06-18 DIAGNOSIS — I42.0 DILATED CARDIOMYOPATHY: ICD-10-CM

## 2024-06-18 LAB
ANION GAP SERPL CALCULATED.3IONS-SCNC: 11 MMOL/L (ref 5–15)
BUN SERPL-MCNC: 44 MG/DL (ref 6–20)
BUN/CREAT SERPL: 37 (ref 7–25)
CALCIUM SPEC-SCNC: 9.2 MG/DL (ref 8.6–10.5)
CHLORIDE SERPL-SCNC: 101 MMOL/L (ref 98–107)
CO2 SERPL-SCNC: 25 MMOL/L (ref 22–29)
CREAT SERPL-MCNC: 1.19 MG/DL (ref 0.57–1)
DEPRECATED RDW RBC AUTO: 47.8 FL (ref 37–54)
EGFRCR SERPLBLD CKD-EPI 2021: 53.4 ML/MIN/1.73
ERYTHROCYTE [DISTWIDTH] IN BLOOD BY AUTOMATED COUNT: 13.3 % (ref 12.3–15.4)
GLUCOSE SERPL-MCNC: 104 MG/DL (ref 65–99)
HCT VFR BLD AUTO: 39.9 % (ref 34–46.6)
HGB BLD-MCNC: 13.5 G/DL (ref 12–15.9)
MAGNESIUM SERPL-MCNC: 2.3 MG/DL (ref 1.6–2.6)
MCH RBC QN AUTO: 32.3 PG (ref 26.6–33)
MCHC RBC AUTO-ENTMCNC: 33.8 G/DL (ref 31.5–35.7)
MCV RBC AUTO: 95.5 FL (ref 79–97)
PLATELET # BLD AUTO: 235 10*3/MM3 (ref 140–450)
PMV BLD AUTO: 9.2 FL (ref 6–12)
POTASSIUM SERPL-SCNC: 4.6 MMOL/L (ref 3.5–5.2)
QT INTERVAL: 416 MS
QTC INTERVAL: 483 MS
RBC # BLD AUTO: 4.18 10*6/MM3 (ref 3.77–5.28)
SODIUM SERPL-SCNC: 137 MMOL/L (ref 136–145)
WBC NRBC COR # BLD AUTO: 6.27 10*3/MM3 (ref 3.4–10.8)

## 2024-06-18 PROCEDURE — 80048 BASIC METABOLIC PNL TOTAL CA: CPT | Performed by: PHYSICIAN ASSISTANT

## 2024-06-18 PROCEDURE — 25010000002 FENTANYL CITRATE (PF) 50 MCG/ML SOLUTION: Performed by: INTERNAL MEDICINE

## 2024-06-18 PROCEDURE — 36415 COLL VENOUS BLD VENIPUNCTURE: CPT

## 2024-06-18 PROCEDURE — 25810000003 SODIUM CHLORIDE 0.9 % SOLUTION: Performed by: INTERNAL MEDICINE

## 2024-06-18 PROCEDURE — 25010000002 CEFAZOLIN PER 500 MG: Performed by: PHYSICIAN ASSISTANT

## 2024-06-18 PROCEDURE — 71046 X-RAY EXAM CHEST 2 VIEWS: CPT

## 2024-06-18 PROCEDURE — C1722 AICD, SINGLE CHAMBER: HCPCS | Performed by: INTERNAL MEDICINE

## 2024-06-18 PROCEDURE — 33270 INS/REP SUBQ DEFIBRILLATOR: CPT | Performed by: INTERNAL MEDICINE

## 2024-06-18 PROCEDURE — 99152 MOD SED SAME PHYS/QHP 5/>YRS: CPT | Performed by: INTERNAL MEDICINE

## 2024-06-18 PROCEDURE — 99153 MOD SED SAME PHYS/QHP EA: CPT | Performed by: INTERNAL MEDICINE

## 2024-06-18 PROCEDURE — C1896 LEAD, AICD, NON SING/DUAL: HCPCS | Performed by: INTERNAL MEDICINE

## 2024-06-18 PROCEDURE — 93005 ELECTROCARDIOGRAM TRACING: CPT | Performed by: INTERNAL MEDICINE

## 2024-06-18 PROCEDURE — 25010000002 DIPHENHYDRAMINE PER 50 MG: Performed by: INTERNAL MEDICINE

## 2024-06-18 PROCEDURE — 25010000002 BUPIVACAINE (PF) 0.5 % SOLUTION: Performed by: INTERNAL MEDICINE

## 2024-06-18 PROCEDURE — 25010000002 ONDANSETRON PER 1 MG: Performed by: INTERNAL MEDICINE

## 2024-06-18 PROCEDURE — 25010000002 LIDOCAINE 1 % SOLUTION: Performed by: INTERNAL MEDICINE

## 2024-06-18 PROCEDURE — 25010000002 MIDAZOLAM PER 1 MG: Performed by: INTERNAL MEDICINE

## 2024-06-18 PROCEDURE — 85027 COMPLETE CBC AUTOMATED: CPT | Performed by: PHYSICIAN ASSISTANT

## 2024-06-18 PROCEDURE — 83735 ASSAY OF MAGNESIUM: CPT | Performed by: PHYSICIAN ASSISTANT

## 2024-06-18 DEVICE — SUBCUTANEOUS ELECTRODE
Type: IMPLANTABLE DEVICE | Status: FUNCTIONAL
Brand: EMBLEM™ S-ICD

## 2024-06-18 DEVICE — SUBCUTANEOUS IMPLANTABLE CARDIOVERTER DEFIBRILLATOR
Type: IMPLANTABLE DEVICE | Status: FUNCTIONAL
Brand: EMBLEM™ MRI S-ICD

## 2024-06-18 RX ORDER — NITROGLYCERIN 0.4 MG/1
0.4 TABLET SUBLINGUAL
Status: DISCONTINUED | OUTPATIENT
Start: 2024-06-18 | End: 2024-06-18 | Stop reason: HOSPADM

## 2024-06-18 RX ORDER — IBUPROFEN 600 MG/1
600 TABLET ORAL EVERY 6 HOURS SCHEDULED
Qty: 20 TABLET | Refills: 0 | Status: SHIPPED | OUTPATIENT
Start: 2024-06-18 | End: 2024-06-23

## 2024-06-18 RX ORDER — BUPIVACAINE HYDROCHLORIDE 2.5 MG/ML
INJECTION, SOLUTION EPIDURAL; INFILTRATION; INTRACAUDAL
Status: COMPLETED | OUTPATIENT
Start: 2024-06-18 | End: 2024-06-18

## 2024-06-18 RX ORDER — LIDOCAINE HYDROCHLORIDE 10 MG/ML
INJECTION, SOLUTION INFILTRATION; PERINEURAL
Status: DISCONTINUED | OUTPATIENT
Start: 2024-06-18 | End: 2024-06-18 | Stop reason: HOSPADM

## 2024-06-18 RX ORDER — ACETAMINOPHEN 325 MG/1
650 TABLET ORAL EVERY 4 HOURS PRN
Status: DISCONTINUED | OUTPATIENT
Start: 2024-06-18 | End: 2024-06-18 | Stop reason: HOSPADM

## 2024-06-18 RX ORDER — DEXAMETHASONE SODIUM PHOSPHATE 10 MG/ML
INJECTION, SOLUTION INTRAMUSCULAR; INTRAVENOUS
Status: COMPLETED | OUTPATIENT
Start: 2024-06-18 | End: 2024-06-18

## 2024-06-18 RX ORDER — ACETAMINOPHEN 325 MG/1
650 TABLET ORAL EVERY 6 HOURS
Qty: 40 TABLET | Refills: 0 | Status: DISCONTINUED | OUTPATIENT
Start: 2024-06-18 | End: 2024-06-18 | Stop reason: HOSPADM

## 2024-06-18 RX ORDER — MIDAZOLAM HYDROCHLORIDE 1 MG/ML
INJECTION INTRAMUSCULAR; INTRAVENOUS
Status: DISCONTINUED | OUTPATIENT
Start: 2024-06-18 | End: 2024-06-18 | Stop reason: HOSPADM

## 2024-06-18 RX ORDER — BUPIVACAINE HYDROCHLORIDE 5 MG/ML
INJECTION, SOLUTION EPIDURAL; INTRACAUDAL
Status: DISCONTINUED | OUTPATIENT
Start: 2024-06-18 | End: 2024-06-18 | Stop reason: HOSPADM

## 2024-06-18 RX ORDER — SODIUM CHLORIDE 0.9 % (FLUSH) 0.9 %
10 SYRINGE (ML) INJECTION AS NEEDED
Status: DISCONTINUED | OUTPATIENT
Start: 2024-06-18 | End: 2024-06-18 | Stop reason: HOSPADM

## 2024-06-18 RX ORDER — SODIUM CHLORIDE 0.9 % (FLUSH) 0.9 %
3 SYRINGE (ML) INJECTION EVERY 12 HOURS SCHEDULED
Status: DISCONTINUED | OUTPATIENT
Start: 2024-06-18 | End: 2024-06-18 | Stop reason: HOSPADM

## 2024-06-18 RX ORDER — FENTANYL CITRATE 50 UG/ML
INJECTION, SOLUTION INTRAMUSCULAR; INTRAVENOUS
Status: DISCONTINUED | OUTPATIENT
Start: 2024-06-18 | End: 2024-06-18 | Stop reason: HOSPADM

## 2024-06-18 RX ORDER — DIPHENHYDRAMINE HYDROCHLORIDE 50 MG/ML
INJECTION INTRAMUSCULAR; INTRAVENOUS
Status: DISCONTINUED | OUTPATIENT
Start: 2024-06-18 | End: 2024-06-18 | Stop reason: HOSPADM

## 2024-06-18 RX ORDER — ONDANSETRON 2 MG/ML
INJECTION INTRAMUSCULAR; INTRAVENOUS
Status: DISCONTINUED | OUTPATIENT
Start: 2024-06-18 | End: 2024-06-18 | Stop reason: HOSPADM

## 2024-06-18 RX ORDER — FENTANYL CITRATE 50 UG/ML
INJECTION, SOLUTION INTRAMUSCULAR; INTRAVENOUS
Status: COMPLETED | OUTPATIENT
Start: 2024-06-18 | End: 2024-06-18

## 2024-06-18 RX ORDER — NITROGLYCERIN 0.4 MG/1
0.4 TABLET SUBLINGUAL
Status: DISCONTINUED | OUTPATIENT
Start: 2024-06-18 | End: 2024-06-18 | Stop reason: SDUPTHER

## 2024-06-18 RX ORDER — ONDANSETRON 2 MG/ML
4 INJECTION INTRAMUSCULAR; INTRAVENOUS EVERY 6 HOURS PRN
Status: DISCONTINUED | OUTPATIENT
Start: 2024-06-18 | End: 2024-06-18 | Stop reason: SDUPTHER

## 2024-06-18 RX ORDER — IBUPROFEN 600 MG/1
600 TABLET ORAL EVERY 6 HOURS SCHEDULED
Status: DISCONTINUED | OUTPATIENT
Start: 2024-06-18 | End: 2024-06-18 | Stop reason: HOSPADM

## 2024-06-18 RX ORDER — SODIUM CHLORIDE 9 MG/ML
INJECTION, SOLUTION INTRAVENOUS
Status: COMPLETED | OUTPATIENT
Start: 2024-06-18 | End: 2024-06-18

## 2024-06-18 RX ORDER — ACETAMINOPHEN 325 MG/1
650 TABLET ORAL EVERY 6 HOURS SCHEDULED
Qty: 40 TABLET | Refills: 0 | Status: SHIPPED | OUTPATIENT
Start: 2024-06-18 | End: 2024-06-23

## 2024-06-18 RX ORDER — BUPIVACAINE HYDROCHLORIDE 5 MG/ML
INJECTION, SOLUTION EPIDURAL; INTRACAUDAL
Status: COMPLETED | OUTPATIENT
Start: 2024-06-18 | End: 2024-06-18

## 2024-06-18 RX ORDER — ACETAMINOPHEN 325 MG/1
650 TABLET ORAL EVERY 4 HOURS PRN
Status: DISCONTINUED | OUTPATIENT
Start: 2024-06-18 | End: 2024-06-18 | Stop reason: SDUPTHER

## 2024-06-18 RX ORDER — SODIUM CHLORIDE 9 MG/ML
40 INJECTION, SOLUTION INTRAVENOUS AS NEEDED
Status: DISCONTINUED | OUTPATIENT
Start: 2024-06-18 | End: 2024-06-18 | Stop reason: HOSPADM

## 2024-06-18 RX ORDER — ONDANSETRON 2 MG/ML
4 INJECTION INTRAMUSCULAR; INTRAVENOUS EVERY 6 HOURS PRN
Status: DISCONTINUED | OUTPATIENT
Start: 2024-06-18 | End: 2024-06-18 | Stop reason: HOSPADM

## 2024-06-18 RX ADMIN — BUPIVACAINE HYDROCHLORIDE 30 ML: 2.5 INJECTION, SOLUTION EPIDURAL; INFILTRATION; INTRACAUDAL at 08:00

## 2024-06-18 RX ADMIN — DEXAMETHASONE SODIUM PHOSPHATE 2 MG: 10 INJECTION, SOLUTION INTRAMUSCULAR; INTRAVENOUS at 08:06

## 2024-06-18 RX ADMIN — BUPIVACAINE HYDROCHLORIDE 20 ML: 5 INJECTION, SOLUTION EPIDURAL; INTRACAUDAL at 08:06

## 2024-06-18 RX ADMIN — IBUPROFEN 600 MG: 600 TABLET, FILM COATED ORAL at 10:23

## 2024-06-18 RX ADMIN — DEXAMETHASONE SODIUM PHOSPHATE 2 MG: 10 INJECTION, SOLUTION INTRAMUSCULAR; INTRAVENOUS at 08:00

## 2024-06-18 RX ADMIN — SODIUM CHLORIDE 2000 MG: 900 INJECTION INTRAVENOUS at 08:57

## 2024-06-18 RX ADMIN — FENTANYL CITRATE 100 MCG: 50 INJECTION, SOLUTION INTRAMUSCULAR; INTRAVENOUS at 08:00

## 2024-06-18 NOTE — NURSING NOTE
Samara Stringer  :  1965  DOS:  24    Active Hospital Problems    Diagnosis     **Dilated cardiomyopathy     Chronic HFrEF (heart failure with reduced ejection fraction)        Medical records have been reviewed.  Patient is a good candidate for the [facility].  Education provided.  Patient to be scheduled follow up 3-5 days post discharge.      Current Facility-Administered Medications:     acetaminophen (TYLENOL) tablet 650 mg, 650 mg, Oral, Q6H, Shivam Valdovinos, DO    ibuprofen (ADVIL,MOTRIN) tablet 600 mg, 600 mg, Oral, Q6H, Shivam Valdovinos, , 600 mg at 24 1023    Current Outpatient Medications:     aspirin 81 MG EC tablet, Take 1 tablet by mouth Daily., Disp: , Rfl:     dapagliflozin Propanediol (Farxiga) 10 MG tablet, Take 10 mg by mouth Daily for 180 days., Disp: 30 tablet, Rfl: 5    escitalopram (LEXAPRO) 10 MG tablet, Take 1 tablet by mouth Daily., Disp: , Rfl:     furosemide (LASIX) 40 MG tablet, Take 1 tablet by mouth Daily., Disp: 30 tablet, Rfl: 2    meloxicam (MOBIC) 7.5 MG tablet, Take 1 tablet by mouth Daily., Disp: 14 tablet, Rfl: 0    metoprolol succinate XL (TOPROL-XL) 100 MG 24 hr tablet, Take 1 tablet by mouth Daily., Disp: 30 tablet, Rfl: 3    rosuvastatin (CRESTOR) 10 MG tablet, Take 1 tablet by mouth Daily., Disp: , Rfl:     sacubitril-valsartan (Entresto)  MG tablet, Take 1 tablet by mouth 2 (Two) Times a Day., Disp: 60 tablet, Rfl: 11    spironolactone (ALDACTONE) 25 MG tablet, Take 1 tablet by mouth Daily., Disp: 90 tablet, Rfl: 3    acetaminophen (Tylenol) 325 MG tablet, Take 2 tablets by mouth Every 6 (Six) Hours for 5 days. Take in alternating fashion with ibuprofen, Disp: 40 tablet, Rfl: 0    albuterol (ACCUNEB) 0.63 MG/3ML nebulizer solution, Take 3 mL by nebulization Every 6 (Six) Hours As Needed for Wheezing., Disp: 20 each, Rfl: 0    albuterol sulfate  (90 Base) MCG/ACT inhaler, Inhale 2 puffs Every 4 (Four)  Hours As Needed for Wheezing., Disp: 17 g, Rfl: 0    budesonide-formoterol (SYMBICORT) 160-4.5 MCG/ACT inhaler, Inhale 2 puffs 2 (Two) Times a Day. Pt takes prn, Disp: , Rfl:     ibuprofen (ADVIL,MOTRIN) 600 MG tablet, Take 1 tablet by mouth Every 6 (Six) Hours for 5 days. Take in alternating fashion with acetaminophen, Disp: 20 tablet, Rfl: 0    loratadine (CLARITIN) 10 MG tablet, Take 1 tablet by mouth Daily., Disp: , Rfl:      Echo Results:  Results for orders placed in visit on 01/18/24    Adult Transthoracic Echo Complete W/ Cont if Necessary Per Protocol    Interpretation Summary    Left ventricular systolic function is moderately decreased. Calculated left ventricular 3D EF = 31% Left ventricular ejection fraction appears to be 31 - 35%.    The left ventricular cavity is moderately dilated. Left ventricular wall thickness is consistent with mild concentric hypertrophy.    Grade II diastolic dysfunction.    The right ventricular cavity is mildly dilated.  Borderline right ventricular systolic function.    Moderate biatrial dilation.    Moderate mitral valve regurgitation is present, eccentric and directed posteriorly.  Eccentricity of the regurgitant jet may underestimate severity of regurgitation.    Moderate to severe tricuspid valve regurgitation is present.    Estimated right ventricular systolic pressure from tricuspid regurgitation is moderately elevated (45-55 mmHg). Calculated right ventricular systolic pressure from tricuspid regurgitation is 49 mmHg.    There is a trivial circumferential pericardial effusion.       Heart Failure Education    [x]  Risk factors  []  Medications management and adherence  []  Low sodium diet  []  Fluid restriction:   []  Exercise/activity/cardiac rehab  []  Smoking cessation  [x]  Signs/symptoms  [x]  Daily weight management  [x]  Weight management  [x]  Importance of keeping follow up office visits  [x]  Role of [facility]  []  Other   []  Hyperkalemia  []   Other:    []  EF teaching    Unable to provide heart failure education today:  []  Patient/family refused   []  Not available  []  Not able to participate   []  Other:

## 2024-06-18 NOTE — ANESTHESIA PROCEDURE NOTES
Peripheral Block      Patient reassessed immediately prior to procedure    Start time: 6/18/2024 8:06 AM  Reason for block: at surgeon's request and post-op pain management  Performed by  CRNA/CAA: Sergio Lara CRNA  Assisted by: Jazmín Yeung RN  Preanesthetic Checklist  Completed: patient identified, IV checked, site marked, risks and benefits discussed, surgical consent, monitors and equipment checked, pre-op evaluation and timeout performed  Prep:  Pt Position: supine  Sterile barriers:gloves, mask, washed/disinfected hands and cap  Prep: ChloraPrep  Patient monitoring: blood pressure monitoring, continuous pulse oximetry and EKG  Procedure    Sedation: yes  Performed under: local infiltration  Guidance:ultrasound guided    ULTRASOUND INTERPRETATION.  Using ultrasound guidance a 20 G gauge needle was placed in close proximity to the nerve, at which point, under ultrasound guidance anesthetic was injected in the area of the nerve and spread of the anesthesia was seen on ultrasound in close proximity thereto.  There were no abnormalities seen on ultrasound; a digital image was taken; and the patient tolerated the procedure with no complications. Images:still images obtained, printed/placed on chart    Laterality:left  Anesthesia block type: Serratus Plane.  Injection Technique:single-shot  Needle Type:echogenic and short-bevel  Needle Gauge:20 G  Resistance on Injection: none    Medications Used: dexamethasone sodium phosphate injection - Injection   2 mg - 6/18/2024 8:06:00 AM  bupivacaine (PF) (MARCAINE) 0.5 % injection - Injection   20 mL - 6/18/2024 8:06:00 AM      Post Assessment  Injection Assessment: negative aspiration for heme, no paresthesia on injection and incremental injection  Patient Tolerance:comfortable throughout block  Complications:no  Additional Notes  A high-frequency linear transducer, with sterile cover, was placed in the patient's midaxillary line in the transverse plane at the level  "of the fifth rib. The rib, pleural line, and overlying serratus anterior and latissimus dorsi muscles were visualized. The insertion site was prepped in sterile fashion and then localized with 2-5 ml of 1% Lidocaine. Using ultrasound-guidance, a 20-gauge B-Sood 4\" Ultraplex 360 non-stimulating echogenic needle was advanced in-plane, caudad to cephalad, at an angle of approximately 45 degrees towards the fifth rib. Once the needle tip was observed in the appropriate plane, anterior to the fifth rib and deep to the serratus anterior muscle, 1 to 3 mL of preservative-free normal saline was injected to confirm needle placement, and to hydro-dissect fascial layers. After opening the fascial plane, the local anesthetic was injected in 3-5 ml aliquots. Aspiration every 5 ml to prevent intravascular injection. Injection was completed with negative aspiration of blood and negative intravascular injection. Injection pressures were normal with minimal resistance.   Performed by: Sergio Lara CRNA            "

## 2024-06-18 NOTE — INTERVAL H&P NOTE
H&P reviewed. The patient was examined and there are no changes to the H&P.       EP Pre-Procedure Report  Cardiovascular Laboratory  [facility]    Patient:  Samara Stringer  :  1965    DATE: 2024      MEDICATIONS:  Prior to Admission medications    Medication Sig Start Date End Date Taking? Authorizing Provider   albuterol (ACCUNEB) 0.63 MG/3ML nebulizer solution Take 3 mL by nebulization Every 6 (Six) Hours As Needed for Wheezing. 22   Fannie Arana MD   albuterol sulfate  (90 Base) MCG/ACT inhaler Inhale 2 puffs Every 4 (Four) Hours As Needed for Wheezing. 4/15/22   Vesta Newsome APRN   aspirin 81 MG EC tablet Take 1 tablet by mouth Daily.    Denys Brenner MD   budesonide-formoterol (SYMBICORT) 160-4.5 MCG/ACT inhaler Inhale 2 puffs 2 (Two) Times a Day. Pt takes prn  Patient not taking: Reported on 2024    Denys Brenner MD   dapagliflozin Propanediol (Farxiga) 10 MG tablet Take 10 mg by mouth Daily for 180 days. 4/9/24 10/6/24  Jack Turpin MD   escitalopram (LEXAPRO) 10 MG tablet Take 1 tablet by mouth Daily.  Patient not taking: Reported on 2024    Denys Brenner MD   furosemide (LASIX) 40 MG tablet Take 1 tablet by mouth Daily. 24   Unique Austin APRN   loratadine (CLARITIN) 10 MG tablet Take 1 tablet by mouth Daily.    Denys Brenner MD   meloxicam (MOBIC) 7.5 MG tablet Take 1 tablet by mouth Daily.  Patient not taking: Reported on 2024   Vesta Newsome APRN   metoprolol succinate XL (TOPROL-XL) 100 MG 24 hr tablet Take 1 tablet by mouth Daily. 24   Jack Turpin MD   rosuvastatin (CRESTOR) 10 MG tablet Take 1 tablet by mouth Daily.    Denys Brenner MD   sacubitril-valsartan (Entresto)  MG tablet Take 1 tablet by mouth 2 (Two) Times a Day. 24   Jack Turpin MD   spironolactone (ALDACTONE) 25 MG tablet Take 1 tablet by mouth Daily. 24   Papi,  MD Jack       Past medical & surgical history, social and family history reviewed in the electronic medical record.    Physical Exam:    Vitals: 117/62, HR 64, SAT 99%  GENERAL: No apparent distress.  No significant changes since last exam.  CHEST: Clear to auscultation bilaterally no stridor no wheeze.  CV: S1, S2, regular without Murmurs, Rubs or Gallops  EXTREMITIES: No edema.        Results from last 7 days   Lab Units 06/18/24  0716   WBC 10*3/mm3 6.27   HEMOGLOBIN g/dL 13.5   HEMATOCRIT % 39.9   PLATELETS 10*3/mm3 235       IMPRESSION:Cheif Complaint EP: Cardiomyopathy      PLAN:  Procedure to perform: SICD        Electronically signed by SHANTEL Casanova, 06/18/24, 7:29 AM EDT.

## 2024-06-18 NOTE — ANESTHESIA PROCEDURE NOTES
Subpectoral      Patient reassessed immediately prior to procedure    Start time: 6/18/2024 8:00 AM  Reason for block: at surgeon's request and post-op pain management  Performed by  CRNA/CAA: Sergio Lara CRNA  Assisted by: Jazmín Yeung RN  Preanesthetic Checklist  Completed: patient identified, IV checked, site marked, risks and benefits discussed, surgical consent, monitors and equipment checked, pre-op evaluation and timeout performed  Prep:  Pt Position: supine  Sterile barriers:washed/disinfected hands, gloves, mask and cap  Prep: ChloraPrep  Patient monitoring: blood pressure monitoring, continuous pulse oximetry and EKG  Procedure    Sedation: yes  Performed under: local infiltration  Guidance:ultrasound guided    ULTRASOUND INTERPRETATION.  Using ultrasound guidance a 20 G gauge needle was placed in close proximity to the nerve, at which point, under ultrasound guidance anesthetic was injected in the area of the nerve and spread of the anesthesia was seen on ultrasound in close proximity thereto.  There were no abnormalities seen on ultrasound; a digital image was taken; and the patient tolerated the procedure with no complications. Images:still images obtained, printed/placed on chart    Laterality:left  Anesthesia block type: Subpectoral Interfascial Plane.  Injection Technique:single-shot  Needle Type:echogenic and short-bevel  Needle Gauge:20 G  Resistance on Injection: none    Medications Used: fentaNYL citrate (PF) (SUBLIMAZE) injection - Intravenous   100 mcg - 6/18/2024 8:00:00 AM  dexamethasone sodium phosphate injection - Injection   2 mg - 6/18/2024 8:00:00 AM  bupivacaine PF (MARCAINE) 0.25 % injection - Injection   30 mL - 6/18/2024 8:00:00 AM      Post Assessment  Injection Assessment: negative aspiration for heme, no paresthesia on injection and incremental injection  Patient Tolerance:comfortable throughout block  Complications:no  Additional Notes  A high-frequency linear transducer,  "with sterile cover, was placed in a parasagittal plane 2 cm lateral to the sternum at the 4th rib. The insertion site was prepped in sterile fashion and then localized with 2-5 ml of 1% Lidocaine. Using ultrasound-guidance, a 20-gauge B-Sood 4\" Ultraplex 360 non-stimulating echogenic needle was advanced in plane until the tip of the needle is in the fascial plane between the pectoralis major and external intercostal muscle at the level of the 4th rib. 1-3ml of preservative free normal saline was used to hydro-dissect the fascial planes. After the fascial planes were verified, the local anesthetic was injected caudad to cephalad. Another injection was performed at the same level (4th rib) in plane, from cephalad to caudad with the local anesthetic. Aspiration every 5 ml to prevent intravascular injection. Injection was completed with negative aspiration of blood and negative intravascular injection. Injection pressures were normal with minimal resistance. This block can be performed for single sided coverage or bilateral coverage of the sternum.  Performed by: Sergio Lara CRNA            "

## 2024-06-18 NOTE — OP NOTE
"      Cardiac Electrophysiology Procedure Note       [facility]    PROCEDURE PERFORMED:  IMPLANTATION OF A TOTALLY SUBCUTANEOUS                                                           ICD (SICD).    OPERATIONS PERFORMED: Implantation of a [employer] SICD A219 [id number] serial number pulse generator with [employer] SICD lead [id number], [id number] serial number.    ATTENDING SURGEON:  Shivam Valdovinos DO    MODERATE SEDATION FOR PROCEDURE:    Moderate sedation was given during this procedure.    I supervised and directed RN to administer this sedation.  This staff member also monitored the patient's hemodynamic and respiratory status and response to these medications.  Please see the full detailed procedure report generated by the electrophysiology laboratory staff.  The patient tolerated moderate sedation well.  There were no complications regarding sedation.  The total dose of fentanyl was 250 mcg and the total dose of midazolam was 5 mg.  The total dose of Brevital was 60mg.  First sedation was administered at 0922 and continued through 09.    ESTIMATED BLOOD LOSS: none    RADIATION EXPOSURE: 0 minutes and 0 milliGray.    COMPLICATIONS: none    TIME OUT: Time out was completed with verification of the correct patient identity, procedure to be performed, procedure site and implanted equipment.    INDICATION FOR PROCEDURE:  Briefly,  Samara Stringer is a 58 y.o. year old female with a history of nonischemic cardiomyopathy with a QRS duration of 90 milliseconds, severe systolic dysfunction with an LVEF of 31%  and NYHA functional class III heart failure.     Past Cardiac Testin. Last Coronary Angio: 24  Angiographically \"near-normal\" coronary arteries  Nonischemic dilated cardiomyopathy     2. Last Echo: 24    Left ventricular systolic function is moderately decreased. Calculated left ventricular 3D EF = 31% Left ventricular ejection fraction appears to be 31 " - 35%.    The left ventricular cavity is moderately dilated. Left ventricular wall thickness is consistent with mild concentric hypertrophy.    Grade II diastolic dysfunction.    The right ventricular cavity is mildly dilated.  Borderline right ventricular systolic function.    Moderate biatrial dilation.    Moderate mitral valve regurgitation is present, eccentric and directed posteriorly.  Eccentricity of the regurgitant jet may underestimate severity of regurgitation.    Moderate to severe tricuspid valve regurgitation is present.    Estimated right ventricular systolic pressure from tricuspid regurgitation is moderately elevated (45-55 mmHg). Calculated right ventricular systolic pressure from tricuspid regurgitation is 49 mmHg.    There is a trivial circumferential pericardial effusion.    Please especially note that the patient has been on maximally tolerated doses of guideline directed medical therapy, including but not limited to: HF indicated beta-blocker (carvedilol, metoprolol succinate), ACE Inhibitor or Angiotensin receptor blocker.  Please note that for some of these medications the maximally tolerated dose may be zero.  The patient has not had a myocardial infarction within 40 days and has not had coronary revascularization within 90 days.    This patient who is a candidate for an ICD has a life expectancy greater than 12 months.    PROCEDURE AND FINDINGS:     The patient was able to give written informed consent after revisiting the key portions of the risk versus benefit profile of the procedure.  This discussion was framed by our lengthy conversations  (please see our detailed notes).  Patient verbalized strong understanding of this discussion and a strong desire to proceed with the procedure.  Please note that this detailed informed consent process utilized mutual and shared decision making process between all parties involved, principally the physician and patient, but also potentially with input  from the patient's selected family and friends.    The patient was brought to the electrophysiology suite in a post-absorptive state.  Informed consent was obtained prior to the procedure and confirmed.  Intravenous prophylactic antibiotics were administered and confirmed to be completely infused prior to start of the procedure.  Additionally intravenous acetominophen was given prophylactically for pain as well.    Patient was appropriately screened for SICD implantation.  The following vectors were deemed acceptable in both the supine and standing position: all three vectors.    We assessed the patient for the appropriate site of implantation for the S ICD using a combination of surface anatomic landmarks including the angle of Monico suprasternal notch xiphoid process 4th and 5th intercostal spaces mid axillary line and both right and left force of the sternum.  We confirmed the correct device implantation site with fluoroscopy.  The correct site of implantation was marked with an indelible ink marker.    The patient was then prepared and draped in routine sterile fashion.  After the site of implantation was prepped and draped in the usual sterile fashion and after adequate anesthesia was given, the skin was infiltrated with 1% lidocaine and 0.5% bupivicaine 50/50 mixture.  The skin was incised with a #10 scalpel.  There were two incisions.  The primary incision is at the left lateral chest wall at the level of the 4th intercostal space approximately.  The secondary incision is at the xiphoid process.  With all incisions blunt, Metzenbaum scissor and electrosurgical dissection was carried out to the level of the fascia with careful attention paid to hemostasis.  A pocket to house the pulse generator was formed between the muscular fascia and subcutaneous fat with blunt and electrosurgical dissection.  The pocket dissection was carried out in a posterior direction.  Critical muscular anatomic landmarks of the  serratus anterior pectoralis major and latissimus dorsi muscles were noted. These muscles formed the medial anterior and posterior aspects of the device pocket.  The pocket was copiously irrigated with antibiotic containing normal saline and subsequently observed.  Once adequate hemostasis was confirmed we packed the primary incision with several laparotomy sponges soaked in lidocaine bupivacaine epinephrine and gentamicin.  These were later removed with the correct surgical sponge count after the totally portion of the procedure.  We then tunneled the lead from the primary incision to the secondary incision and then along the sternum.  The lead was sutured appropriately to fascia with nonabsorbable suture.    We then removed the laparotomy sponges from the primary device pocket.  Once again we confirmed the correct surgical sponge count.    The S ICD pulse generator was brought onto the field the pin of the lead was then connected to the appropriate port.  Testing was performed showing this carried connection.  We then placed the S ICD pulse generator within the pocket.  The device was then secured to the header stitch.    Both wounds were then closed with 2 layers of absorbable suture and finally a layer of surgical adhesive.   We then placed a surgical dressing.    Shock impedance was 79 Ohms.    The patient is this ICD was optimized in the room prior to the patient leaving.    The S ICD chose the secondary vector, however the primary and alternate vectors were also acceptable.    UNDERLYING RHYTHM: sinus    PROGRAMMED DEVICE DATA:    Post shock pacing was programmed on.  Shock polarity is programmed as standard.  Two zones of tachycardia detection program.  There is a conditional shock zone programmed at 200 bpm and shock zone is additionally programmed at 250 bpm.  PLEASE NOTE THAT THE SHOCK OUTPUT ON THE S ICD IS NON PROGRAMMABLE AND IS FIXED AT 80 JOULES.    CONCLUSION:  Successful implantation of an SICD  "system.      RECOMMENDATIONS:    No Lovenox or heparin at any dose is to be given.        FOR THE PATIENT        1) We ask you the patient to to leave the wound open to the air.  The glue is the bandage.    2) We ask you to not shower or get the wounds wet for 2 days only.  After this you may shower but we ask that you not scrub or attempt to \"clean\" your surgery wounds.    3) Patients may experience some discomfort especially when performing side bending or twisting of the torso.  This is normal.  Mild to moderate pain is normal and is to be expected.  While there is no explicit recommendation to not lift any heavy objects or raise the left arm above a certain level, doing so may result in some discomfort.  The discomfort should be limiting factor in performing these maneuvers.  In other words, discomfort should limit your activity.  It is not our goal to completely eliminate your pain as this may lead to overactivity, poor wound healing and worse outcomes.  We prefer not to prescribe narcotics for this reason.    4) We ask that you call our office at  with any questions, 24 hours a day and specifically ask for the EP doctor on call with any questions about the device or the wounds.    5) We ask that you follow up with our EP Service Nurse Practitioners in approximately one week and then with me in 3 months.          Shivam Valdovinos DO, FAC, [facility]  Cardiac Electrophysiologist  [facility] / [facility]    "

## 2024-06-18 NOTE — DISCHARGE INSTR - ACTIVITY
"  1) We ask you the patient to to leave the wound open to the air.  The glue is the bandage.     2) We ask you to not shower or get the wounds wet for 2 days only.  After this you may shower but we ask that you not scrub or attempt to \"clean\" your surgery wounds.     3) Patients may experience some discomfort especially when performing side bending or twisting of the torso.  This is normal.  Mild to moderate pain is normal and is to be expected.  While there is no explicit recommendation to not lift any heavy objects or raise the left arm above a certain level, doing so may result in some discomfort.  The discomfort should be limiting factor in performing these maneuvers.  In other words, discomfort should limit your activity.  It is not our goal to completely eliminate your pain as this may lead to overactivity, poor wound healing and worse outcomes.  We prefer not to prescribe narcotics for this reason.     4) We ask that you call our office at  with any questions, 24 hours a day and specifically ask for the EP doctor on call with any questions about the device or the wounds.     5) We ask that you follow up with our EP Service Nurse Practitioners in approximately one week and then with me in 3 months.  "

## 2024-06-19 ENCOUNTER — CALL CENTER PROGRAMS (OUTPATIENT)
Dept: CALL CENTER | Facility: HOSPITAL | Age: 59
End: 2024-06-19
Payer: COMMERCIAL

## 2024-06-19 NOTE — OUTREACH NOTE
PCI/Device Survey      Flowsheet Row Responses   Facility patient discharged from? [place]   Procedure date 06/18/24   Procedure (if device, specify in description) Device   Device Description IMPLANTATION OF A TOTALLY SUBCUTANEOUS                                                           ICD (SICD).   Performing MD Dr. Shivam Valdovinos   Attempt successful? No   Unsuccessful attempts Attempt 1            KONRAD PIEDRA - Registered Nurse

## 2024-06-19 NOTE — OUTREACH NOTE
PCI/Device Survey      Flowsheet Row Responses   Facility patient discharged from? [place]   Procedure date 06/18/24   Procedure (if device, specify in description) Device   Device Description IMPLANTATION OF A TOTALLY SUBCUTANEOUS                                                           ICD (SICD).   Performing MD Dr. Shivma Valdovinos   Attempt successful? No   Unsuccessful attempts Attempt 2            KONRAD PIEDRA - Registered Nurse

## 2024-06-25 ENCOUNTER — OFFICE VISIT (OUTPATIENT)
Dept: CARDIOLOGY | Facility: CLINIC | Age: 59
End: 2024-06-25
Payer: COMMERCIAL

## 2024-06-25 DIAGNOSIS — I42.0 DILATED CARDIOMYOPATHY: Primary | ICD-10-CM

## 2024-06-25 PROCEDURE — 99024 POSTOP FOLLOW-UP VISIT: CPT | Performed by: INTERNAL MEDICINE

## 2024-06-25 NOTE — PROGRESS NOTES
06/25/2024    Name: Samara Stringer  YOB: 1965    WOUND CHECK    Patient has fever: [] YES   [x] NO     Temperature if indicated:     Wound Location:  Mid-Axillary  and Xiphoid-Process     Surgical Glue: intact     Wound Appearance: clear/intact     Plan: normal wound check      Did patient receive home monitoring box? [x] YES   [] NO  (If no, contact device clinic.)    Does patient have questions about remote monitoring? [] YES   [x] NO (If yes notify device clinic)      Notes:       Appointment for follow-up scheduled for 3 months post procedure [x]    Future Appointments   Date Time Provider Department Center   6/25/2024 10:30 AM WOUND CHECK [facility]   8/19/2024 10:30 AM Jack Turpin MD [facility]   9/23/2024 11:45 AM Shivam Valdovinos DO [facility]          Can Mcneil MA, 06/25/24

## 2024-06-30 LAB
QT INTERVAL: 416 MS
QTC INTERVAL: 483 MS

## 2024-07-15 DIAGNOSIS — I50.22 CHRONIC HFREF (HEART FAILURE WITH REDUCED EJECTION FRACTION): Chronic | ICD-10-CM

## 2024-07-15 RX ORDER — METOPROLOL SUCCINATE 100 MG/1
100 TABLET, EXTENDED RELEASE ORAL DAILY
Qty: 90 TABLET | Refills: 3 | Status: SHIPPED | OUTPATIENT
Start: 2024-07-15

## 2024-07-15 NOTE — TELEPHONE ENCOUNTER
"Caller: Samara Stringer \"Samara Carpenter\"    Relationship: Self    Best call back number: [phone]     Requested Prescriptions:   Requested Prescriptions     Pending Prescriptions Disp Refills    metoprolol succinate XL (TOPROL-XL) 100 MG 24 hr tablet 30 tablet 3     Sig: Take 1 tablet by mouth Daily.        Pharmacy where request should be sent: [address] [phone] [facility] [phone]      Last office visit with prescribing clinician: 6/17/2024   Last telemedicine visit with prescribing clinician: Visit date not found   Next office visit with prescribing clinician: 8/19/2024     Additional details provided by patient: NONE, OUT AS OF 7/12/24    Does the patient have less than a 3 day supply:  [x] Yes  [] No    Would you like a call back once the refill request has been completed: [] Yes [] No    If the office needs to give you a call back, can they leave a voicemail: [] Yes [] No    Fransisco Fine Rep   07/15/24 11:31 EDT       "

## 2024-08-23 NOTE — PROGRESS NOTES
"    [facility] Cardiology Office Follow Up Note    Samara Stringer  [id number]  2024    Primary Care Provider: Provider, No Known    Chief Complaint   Patient presents with    Follow-up    Congestive Heart Failure       Subjective     History of Present Illness:   Samara Stringer is a 59 y.o. female with chronic HFrEF, NICM, prediabetes, and tobacco abuse  who presents to the Cardiology Clinic for follow up of ICD implantation.  Since her last visit, she had a subcutaneous ICD implanted by Dr. Valdovinos in early .  Says the procedure went well without any complication and she felt fine until about 2 weeks after which she started to have pain at the site.  She was moving some heavy furniture and thinks that she strained a muscle.  That pain eventually went away.  There was no swelling or drainage noted.  Patient says that she is feeling very well from a cardiac standpoint.  Denies exertional chest pain or dyspnea.  Uses Lasix every other day.  Has noted that her weight is going up but does not really feel swollen or volume overloaded.  She is compliant with all of her other medications.    Past Cardiac Testin. Last Coronary Angio: 24  Angiographically \"near-normal\" coronary arteries  Nonischemic dilated cardiomyopathy     2. Last Echo: 24    Left ventricular systolic function is moderately decreased. Calculated left ventricular 3D EF = 31% Left ventricular ejection fraction appears to be 31 - 35%.    The left ventricular cavity is moderately dilated. Left ventricular wall thickness is consistent with mild concentric hypertrophy.    Grade II diastolic dysfunction.    The right ventricular cavity is mildly dilated.  Borderline right ventricular systolic function.    Moderate biatrial dilation.    Moderate mitral valve regurgitation is present, eccentric and directed posteriorly.  Eccentricity of the regurgitant jet may underestimate severity of regurgitation.    Moderate to " severe tricuspid valve regurgitation is present.    Estimated right ventricular systolic pressure from tricuspid regurgitation is moderately elevated (45-55 mmHg). Calculated right ventricular systolic pressure from tricuspid regurgitation is 49 mmHg.    There is a trivial circumferential pericardial effusion.     3. Prior Stress Testing: none     4. Prior Holter Monitor: none    Review of Systems:  Review of Systems   Constitutional: Negative.    Respiratory: Negative.     Cardiovascular: Negative.    Gastrointestinal: Negative.    Musculoskeletal: Negative.    Neurological: Negative.        I have reviewed and/or updated the patient's past medical, past surgical, family, social history, problem list and allergies as appropriate.       Current Outpatient Medications:     albuterol (ACCUNEB) 0.63 MG/3ML nebulizer solution, Take 3 mL by nebulization Every 6 (Six) Hours As Needed for Wheezing., Disp: 20 each, Rfl: 0    albuterol sulfate  (90 Base) MCG/ACT inhaler, Inhale 2 puffs Every 4 (Four) Hours As Needed for Wheezing., Disp: 17 g, Rfl: 0    aspirin 81 MG EC tablet, Take 1 tablet by mouth Daily., Disp: , Rfl:     budesonide-formoterol (SYMBICORT) 160-4.5 MCG/ACT inhaler, Inhale 2 puffs 2 (Two) Times a Day. Pt takes prn, Disp: , Rfl:     dapagliflozin Propanediol (Farxiga) 10 MG tablet, Take 10 mg by mouth Daily for 180 days., Disp: 30 tablet, Rfl: 5    escitalopram (LEXAPRO) 10 MG tablet, Take 1 tablet by mouth Daily., Disp: , Rfl:     furosemide (LASIX) 40 MG tablet, Take 1 tablet by mouth Daily., Disp: 30 tablet, Rfl: 2    loratadine (CLARITIN) 10 MG tablet, Take 1 tablet by mouth Daily., Disp: , Rfl:     meloxicam (MOBIC) 7.5 MG tablet, Take 1 tablet by mouth Daily., Disp: 14 tablet, Rfl: 0    metoprolol succinate XL (TOPROL-XL) 100 MG 24 hr tablet, Take 1 tablet by mouth Daily., Disp: 90 tablet, Rfl: 3    rosuvastatin (CRESTOR) 10 MG tablet, Take 1 tablet by mouth Daily., Disp: , Rfl:      "sacubitril-valsartan (Entresto)  MG tablet, Take 1 tablet by mouth 2 (Two) Times a Day., Disp: 60 tablet, Rfl: 11    spironolactone (ALDACTONE) 25 MG tablet, Take 1 tablet by mouth Daily., Disp: 90 tablet, Rfl: 3       Objective     Vitals:  Vitals:    08/26/24 1056   BP: 118/68   BP Location: Right arm   Patient Position: Sitting   Pulse: 70   SpO2: 98%   Weight: 73 kg (161 lb)   Height: 167.6 cm (66\")       Physical Exam:  Vitals reviewed.   Constitutional:       Appearance: Healthy appearance. Not in distress.   Neck:      Vascular: No JVD.   Pulmonary:      Effort: Pulmonary effort is normal.      Breath sounds: Normal breath sounds.   Cardiovascular:      Normal rate. Regular rhythm. Normal S1. Normal S2.       Murmurs: There is no murmur.      No gallop.  No click. No rub.   Pulses:     Intact distal pulses.   Edema:     Ankle: bilateral trace edema of the ankle.  Abdominal:      General: There is no distension.      Palpations: Abdomen is soft.      Tenderness: There is no abdominal tenderness.   Skin:     General: Skin is warm and dry.         Results Review:   I reviewed the patient's new clinical results.  I reviewed the patient's new imaging results and agree with the interpretation.  I reviewed the patient's other test results and agree with the interpretation    Procedures        Assessment & Plan   Diagnoses and all orders for this visit:    1. Chronic HFrEF (heart failure with reduced ejection fraction) (Primary)  2. Dilated cardiomyopathy  Nonischemic, dilated cardiomyopathy.  Nonobstructive CAD on angiography.  EF 30%. Grade II diastolic dysfunction, RVSP 50.  NYHA class II, stage C  Weight has increased by 10 to 15 pounds since her last visit but she appears quite euvolemic on exam.  Only trace ankle edema.  This is likely from dietary indiscretion.  Previous dry weight was around 150.  -- Advised her to watch her weight closely and continue using Lasix every other day with increase as needed " based on weight and symptoms.  -- Continue metoprolol, Entresto, spironolactone, and Farxiga.  Further dose titration precluded by borderline blood pressure.  -- SICD incision site looks well-healed.  No drainage.  Encouraged continued follow-up with EP post ICD    3. Heart valve disease  Echo January 2024: moderate MR, eccentric and posterior. Moderate to severe TR. Acutely decompensated at that point.  No significant murmur today.  -- No strong indication for echo, especially in the absence of symptoms.  Will proceed with regular surveillance echocardiography every 2 to 3 years based on exam today.    4. Stage 3a chronic kidney disease  GFR 55 on BMP.  Baseline Cr 1.2.    -- Placing referral to nephrology    5. Snoring  Does snore and complains of some insomnia, trouble falling asleep.    -- Placing referral to sleep clinic                Plan   Orders Placed This Encounter   Procedures    Ambulatory Referral to Sleep Medicine     Referral Priority:   Routine     Referral Type:   Consultation     Referral Reason:   Specialty Services Required     Referred to Provider:   Ramone Mckeon MD     Requested Specialty:   Sleep Medicine     Number of Visits Requested:   1    Ambulatory Referral to Nephrology     Referral Priority:   Routine     Referral Type:   Consultation     Referral Reason:   Specialty Services Required     Requested Specialty:   Nephrology     Number of Visits Requested:   1     Medications:    Preventative Cardiology:   Tobacco Cessation: N/A  Obstructive Sleep Apnea Screening: Deffered  AAA Screening: Deffered  Peripheral Arterial Disease Screening: Deffered       Follow Up:   Return in about 3 months (around 11/26/2024).    Thank you for allowing me to participate in the care of your patient. Please to not hesitate to contact me with additional questions or concerns.     Jack Turpin MD  08/26/2024  15:33 EDT

## 2024-08-26 ENCOUNTER — OFFICE VISIT (OUTPATIENT)
Dept: CARDIOLOGY | Facility: CLINIC | Age: 59
End: 2024-08-26
Payer: COMMERCIAL

## 2024-08-26 VITALS
HEART RATE: 70 BPM | HEIGHT: 66 IN | BODY MASS INDEX: 25.88 KG/M2 | SYSTOLIC BLOOD PRESSURE: 118 MMHG | OXYGEN SATURATION: 98 % | WEIGHT: 161 LBS | DIASTOLIC BLOOD PRESSURE: 68 MMHG

## 2024-08-26 DIAGNOSIS — I42.0 DILATED CARDIOMYOPATHY: Chronic | ICD-10-CM

## 2024-08-26 DIAGNOSIS — I50.22 CHRONIC HFREF (HEART FAILURE WITH REDUCED EJECTION FRACTION): Primary | Chronic | ICD-10-CM

## 2024-08-26 DIAGNOSIS — I38 HEART VALVE DISEASE: Chronic | ICD-10-CM

## 2024-08-26 DIAGNOSIS — R06.83 SNORING: ICD-10-CM

## 2024-08-26 DIAGNOSIS — N18.31 STAGE 3A CHRONIC KIDNEY DISEASE: Chronic | ICD-10-CM

## 2024-08-26 PROCEDURE — 99214 OFFICE O/P EST MOD 30 MIN: CPT | Performed by: INTERNAL MEDICINE

## 2024-09-06 RX ORDER — FUROSEMIDE 40 MG
40 TABLET ORAL DAILY
Qty: 90 TABLET | Refills: 2 | Status: SHIPPED | OUTPATIENT
Start: 2024-09-06

## 2024-12-19 ENCOUNTER — TELEPHONE (OUTPATIENT)
Dept: CARDIOLOGY | Facility: CLINIC | Age: 59
End: 2024-12-19

## 2025-01-14 DIAGNOSIS — I50.9 ACUTE ON CHRONIC HEART FAILURE, UNSPECIFIED HEART FAILURE TYPE: ICD-10-CM

## 2025-01-14 DIAGNOSIS — I50.22 CHRONIC HFREF (HEART FAILURE WITH REDUCED EJECTION FRACTION): Chronic | ICD-10-CM

## 2025-01-14 RX ORDER — FUROSEMIDE 40 MG/1
40 TABLET ORAL DAILY
Qty: 90 TABLET | Refills: 2 | Status: SHIPPED | OUTPATIENT
Start: 2025-01-14

## 2025-01-14 RX ORDER — DAPAGLIFLOZIN 10 MG/1
10 TABLET, FILM COATED ORAL DAILY
Qty: 30 TABLET | Refills: 5 | Status: SHIPPED | OUTPATIENT
Start: 2025-01-14 | End: 2025-07-13

## 2025-01-14 RX ORDER — METOPROLOL SUCCINATE 100 MG/1
100 TABLET, EXTENDED RELEASE ORAL DAILY
Qty: 90 TABLET | Refills: 3 | Status: SHIPPED | OUTPATIENT
Start: 2025-01-14

## 2025-01-14 NOTE — TELEPHONE ENCOUNTER
"Caller: Samara Stringer \"Samara Hobbsett\"    Relationship: Self    Best call back number: [phone]    Requested Prescriptions:   Requested Prescriptions     Pending Prescriptions Disp Refills    dapagliflozin Propanediol (Farxiga) 10 MG tablet 30 tablet 5     Sig: Take 10 mg by mouth Daily for 180 days.    furosemide (LASIX) 40 MG tablet 90 tablet 2     Sig: Take 1 tablet by mouth Daily.    metoprolol succinate XL (TOPROL-XL) 100 MG 24 hr tablet 90 tablet 3     Sig: Take 1 tablet by mouth Daily.        Pharmacy where request should be sent: [address] [phone] [facility] [phone]      Last office visit with prescribing clinician: Visit date not found   Last telemedicine visit with prescribing clinician: Visit date not found   Next office visit with prescribing clinician: 1/20/2025     Additional details provided by patient: PATIENT IS COMPLETELY OUT OF METOPROLOL. [facility] SCHEDULED PATIENT NEXT AVAILABLE FOLLOW UP WITH DIANNA SUH BECAUSE SHE WAS PREVIOUSLY SEEING DR. FLOWER.     Does the patient have less than a 3 day supply:  [x] Yes  [] No    Would you like a call back once the refill request has been completed: [] Yes [] No    If the office needs to give you a call back, can they leave a voicemail: [] Yes [] No    Fransisco Roy Rep   01/14/25 11:51 EST       "

## 2025-01-23 PROCEDURE — 93296 REM INTERROG EVL PM/IDS: CPT | Performed by: INTERNAL MEDICINE

## 2025-01-23 PROCEDURE — 93295 DEV INTERROG REMOTE 1/2/MLT: CPT | Performed by: INTERNAL MEDICINE

## 2025-01-27 ENCOUNTER — OFFICE VISIT (OUTPATIENT)
Dept: CARDIOLOGY | Facility: CLINIC | Age: 60
End: 2025-01-27
Payer: COMMERCIAL

## 2025-01-27 VITALS
WEIGHT: 177.4 LBS | DIASTOLIC BLOOD PRESSURE: 98 MMHG | OXYGEN SATURATION: 96 % | HEART RATE: 96 BPM | RESPIRATION RATE: 17 BRPM | SYSTOLIC BLOOD PRESSURE: 170 MMHG | HEIGHT: 66 IN | BODY MASS INDEX: 28.51 KG/M2

## 2025-01-27 DIAGNOSIS — N18.31 STAGE 3A CHRONIC KIDNEY DISEASE: Chronic | ICD-10-CM

## 2025-01-27 DIAGNOSIS — I50.22 CHRONIC HFREF (HEART FAILURE WITH REDUCED EJECTION FRACTION): Primary | Chronic | ICD-10-CM

## 2025-01-27 DIAGNOSIS — I42.0 DILATED CARDIOMYOPATHY: ICD-10-CM

## 2025-01-27 DIAGNOSIS — Z72.0 TOBACCO ABUSE: Chronic | ICD-10-CM

## 2025-01-27 DIAGNOSIS — I10 ESSENTIAL HYPERTENSION: ICD-10-CM

## 2025-01-27 DIAGNOSIS — I38 HEART VALVE DISEASE: ICD-10-CM

## 2025-01-27 DIAGNOSIS — Z63.4 DEATH OF FAMILY MEMBER: ICD-10-CM

## 2025-01-27 PROCEDURE — 99214 OFFICE O/P EST MOD 30 MIN: CPT | Performed by: NURSE PRACTITIONER

## 2025-01-27 PROCEDURE — 1159F MED LIST DOCD IN RCRD: CPT | Performed by: NURSE PRACTITIONER

## 2025-01-27 PROCEDURE — 1160F RVW MEDS BY RX/DR IN RCRD: CPT | Performed by: NURSE PRACTITIONER

## 2025-01-27 RX ORDER — SPIRONOLACTONE 25 MG/1
25 TABLET ORAL DAILY
Qty: 90 TABLET | Refills: 3 | Status: SHIPPED | OUTPATIENT
Start: 2025-01-27

## 2025-01-27 RX ORDER — FUROSEMIDE 40 MG/1
40 TABLET ORAL DAILY
Qty: 90 TABLET | Refills: 2 | Status: SHIPPED | OUTPATIENT
Start: 2025-01-27

## 2025-01-27 RX ORDER — SACUBITRIL AND VALSARTAN 97; 103 MG/1; MG/1
1 TABLET, FILM COATED ORAL 2 TIMES DAILY
Qty: 60 TABLET | Refills: 11 | Status: SHIPPED | OUTPATIENT
Start: 2025-01-27

## 2025-01-27 RX ORDER — DAPAGLIFLOZIN 10 MG/1
10 TABLET, FILM COATED ORAL DAILY
Qty: 30 TABLET | Refills: 5 | Status: SHIPPED | OUTPATIENT
Start: 2025-01-27 | End: 2025-07-26

## 2025-01-27 RX ORDER — ROSUVASTATIN CALCIUM 10 MG/1
10 TABLET, COATED ORAL DAILY
Qty: 90 TABLET | Refills: 3 | Status: SHIPPED | OUTPATIENT
Start: 2025-01-27

## 2025-01-27 RX ORDER — UMECLIDINIUM BROMIDE AND VILANTEROL TRIFENATATE 62.5; 25 UG/1; UG/1
POWDER RESPIRATORY (INHALATION)
COMMUNITY

## 2025-01-27 RX ORDER — METOPROLOL SUCCINATE 100 MG/1
100 TABLET, EXTENDED RELEASE ORAL DAILY
Qty: 90 TABLET | Refills: 3 | Status: SHIPPED | OUTPATIENT
Start: 2025-01-27

## 2025-01-27 SDOH — SOCIAL STABILITY - SOCIAL INSECURITY: DISSAPEARANCE AND DEATH OF FAMILY MEMBER: Z63.4

## 2025-01-27 NOTE — PROGRESS NOTES
[facility] Cardiology Office Follow Up Note    Samara Stringer  [id number]  2024    Primary Care Provider: Provider, No Known    No chief complaint on file.      Subjective     History of Present Illness:   Samara Stringer is a 59 y.o. female with chronic HFrEF, NICM, prediabetes, and tobacco abuse who presents to the Cardiology Clinic for a 6 month follow up. Patient with past health history significant for chronic HFrEF, NICM, prediabetes, , tobacco abuse with ongoing smoking, and recent ICD implantation with Dr. Valdovinos.  Since her last appointment patient states that her father has recently passed away and she has had a lot of anxiety related to that.  States she has had her normal baseline shortness of breath and continues to smoke.  States that she did see the Nephrology office and they cut her Lasix to 20mg every other day.  She does not think this is enough.  She is due to see them and plans to follow up soon.  States she has had an occasional chest discomfort which has been noted with her increased anxiety and relieved by breathing through it.  She states she has been out of her Metoprolol and Spironolactone for the past 2 weeks.  She takes in very minimal water daily.  States she has had lower extremity edema intermittently and takes her Lasix if needed.      Past Cardiac Testin. Last Coronary Angio: 2024  oronary circulation is right dominant  Left main: The left main coronary artery divides into the left anterior descending and circumflex coronary arteries. Left main coronary artery is angiographically normal.  LAD: The left anterior descending gives rise to the diagonal branches as well as multiple septal perforators before terminating as an apical recurrent branch. The LAD has no significant disease.  LCX: The circumflex coronary artery is a nondominant vessel giving rise to the obtuse marginal branches. The circumflex coronary artery has no significant  disease.  RCA: The right coronary artery is dominant for the posterior circulation. The right coronary artery has no significant disease.     2. Last Echo: 1/18/2024    Left ventricular systolic function is moderately decreased. Calculated left ventricular 3D EF = 31% Left ventricular ejection fraction appears to be 31 - 35%.    The left ventricular cavity is moderately dilated. Left ventricular wall thickness is consistent with mild concentric hypertrophy.    Grade II diastolic dysfunction.    The right ventricular cavity is mildly dilated.  Borderline right ventricular systolic function.    Moderate biatrial dilation.    Moderate mitral valve regurgitation is present, eccentric and directed posteriorly.  Eccentricity of the regurgitant jet may underestimate severity of regurgitation.    Moderate to severe tricuspid valve regurgitation is present.    Estimated right ventricular systolic pressure from tricuspid regurgitation is moderately elevated (45-55 mmHg). Calculated right ventricular systolic pressure from tricuspid regurgitation is 49 mmHg.    There is a trivial circumferential pericardial effusion.    3. Prior Stress Testing: NA    4. Prior Holter Monitor: NA    Review of Systems:  Review of Systems   Constitutional:  Negative for activity change and fatigue.   Respiratory:  Positive for shortness of breath. Negative for chest tightness.    Cardiovascular:  Positive for leg swelling. Negative for chest pain and palpitations.   Neurological:  Negative for dizziness.   All other systems reviewed and are negative.      I have reviewed and/or updated the patient's past medical, past surgical, family, social history, problem list and allergies as appropriate.       Current Outpatient Medications:     albuterol (ACCUNEB) 0.63 MG/3ML nebulizer solution, Take 3 mL by nebulization Every 6 (Six) Hours As Needed for Wheezing., Disp: 20 each, Rfl: 0    albuterol sulfate  (90 Base) MCG/ACT inhaler, Inhale 2 puffs  "Every 4 (Four) Hours As Needed for Wheezing., Disp: 17 g, Rfl: 0    aspirin 81 MG EC tablet, Take 1 tablet by mouth Daily., Disp: , Rfl:     budesonide-formoterol (SYMBICORT) 160-4.5 MCG/ACT inhaler, Inhale 2 puffs 2 (Two) Times a Day. Pt takes prn, Disp: , Rfl:     dapagliflozin Propanediol (Farxiga) 10 MG tablet, Take 10 mg by mouth Daily for 180 days., Disp: 30 tablet, Rfl: 5    escitalopram (LEXAPRO) 10 MG tablet, Take 1 tablet by mouth Daily., Disp: , Rfl:     furosemide (LASIX) 40 MG tablet, Take 1 tablet by mouth Daily., Disp: 90 tablet, Rfl: 2    loratadine (CLARITIN) 10 MG tablet, Take 1 tablet by mouth Daily., Disp: , Rfl:     metoprolol succinate XL (TOPROL-XL) 100 MG 24 hr tablet, Take 1 tablet by mouth Daily., Disp: 90 tablet, Rfl: 3    rosuvastatin (CRESTOR) 10 MG tablet, Take 1 tablet by mouth Daily., Disp: 90 tablet, Rfl: 3    sacubitril-valsartan (Entresto)  MG tablet, Take 1 tablet by mouth 2 (Two) Times a Day., Disp: 60 tablet, Rfl: 11    spironolactone (ALDACTONE) 25 MG tablet, Take 1 tablet by mouth Daily., Disp: 90 tablet, Rfl: 3    Umeclidinium-Vilanterol (Anoro Ellipta) 62.5-25 MCG/ACT aerosol powder  inhaler, Inhale., Disp: , Rfl:        Objective     Vitals:  Vitals:    01/27/25 0848   BP: 170/98   Pulse: 96   Resp: 17   SpO2: 96%   Weight: 80.5 kg (177 lb 6.4 oz)   Height: 167.6 cm (66\")       Physical Exam:  Vitals and nursing note reviewed.   Constitutional:       Appearance: Healthy appearance. Not in distress. Chronically ill-appearing.   Pulmonary:      Effort: Pulmonary effort is normal.      Breath sounds: Normal breath sounds.   Chest:      Chest wall: Not tender to palpatation.   Cardiovascular:      PMI at left midclavicular line. Normal rate. Regular rhythm.      Murmurs: There is no murmur.   Pulses:     Intact distal pulses.   Edema:     Peripheral edema absent.   Musculoskeletal: Normal range of motion. Skin:     General: Skin is warm and dry.           Comments: " Healing scabbed sores   Neurological:      Mental Status: Alert and oriented to person, place and time.         Results Review:   I reviewed the patient's new clinical results.    Procedures        Assessment & Plan   Diagnoses and all orders for this visit:    1. Chronic HFrEF (heart failure with reduced ejection fraction) (Primary)  -     metoprolol succinate XL (TOPROL-XL) 100 MG 24 hr tablet; Take 1 tablet by mouth Daily.  Dispense: 90 tablet; Refill: 3  -     sacubitril-valsartan (Entresto)  MG tablet; Take 1 tablet by mouth 2 (Two) Times a Day.  Dispense: 60 tablet; Refill: 11  -     Ambulatory Referral to Family Practice    2. Dilated cardiomyopathy    3. Essential hypertension  -     Ambulatory Referral to Family Practice    4. Tobacco abuse  -     Ambulatory Referral to Family Practice    5. Heart valve disease    6. Stage 3a chronic kidney disease  -     Ambulatory Referral to [facility]    7. Death of family member  -     Ambulatory Referral to Family Practice    Other orders  -     dapagliflozin Propanediol (Farxiga) 10 MG tablet; Take 10 mg by mouth Daily for 180 days.  Dispense: 30 tablet; Refill: 5  -     furosemide (LASIX) 40 MG tablet; Take 1 tablet by mouth Daily.  Dispense: 90 tablet; Refill: 2  -     rosuvastatin (CRESTOR) 10 MG tablet; Take 1 tablet by mouth Daily.  Dispense: 90 tablet; Refill: 3  -     spironolactone (ALDACTONE) 25 MG tablet; Take 1 tablet by mouth Daily.  Dispense: 90 tablet; Refill: 3      Chronic EFrEF/ Dilated Cardiomyopathy  -Nonischemic, dilated cardiomyopathy.    -Nonobstructive CAD on angiography.  -EF 30%. Grade II diastolic dysfunction, RVSP 50 in January 1/2024  -NYHA class II, stage C, appears euvolemic on exam without any lower extremity edema  -Advised her to watch her weight closely and continue using Lasix as instructed by Nephrology--patient instructed to call for f/u appointment today  -Continue Metoprolol, Entresto, Spironolactone, and  Farxiga--will send refills in today  -SICD in place and followed by [facility]  -Discussed to start weighing daily again and recording  -Open to referral to Heart Failure Clinic, advised we could discuss at next appointment    Heart Valve Disease  -Echo 1/2024 moderate MR, moderate to severe TR  -SOA is at baseline  -Surveillance echo next year (2025), sooner with new symptoms    Hypertension  -Has been out of some medications particularly Metoprolol for the past week as well as Spironolactone  -Refills sent  -Diet has been poor with recent life events and patient endorses a high sodium diet recently with processed foods and fast foods--discussed to make modifications and increase water intake as well while decreasing soda intake  -Will have patient return to clinic in 2 weeks to reassess blood pressure--instructed to begin keeping log daily  -Educational handouts given regarding limiting salt    Tobacco Abuse  -Continues to smoke daily  -Recommend smoking cessation  -States will think about smoking cessation clinic referral    CKD stage 3A  -GfR-6/2024 was 53  -Discussed necessary to f/u with Nephrology    Recent death of family member  -Admits to being depressed currently  -Referral placed for primary care to establish care locally            Medications:    Preventative Cardiology:   Tobacco Cessation: Cessation Counseling Provided   Obstructive Sleep Apnea Screening: N/A  AAA Screening: N/A  Peripheral Arterial Disease Screening: N/A       Follow Up:   Return in about 2 weeks (around 2/10/2025) for Next scheduled follow up--blood pressure/ symptom reassessment.    Thank you for allowing me to participate in the care of your patient. Please to not hesitate to contact me with additional questions or concerns.     Yenni Payne, APRN  12/04/2024  15:51 EST

## 2025-07-18 LAB
MC_CV_MDC_IDC_RATE_1: 200
MC_CV_MDC_IDC_RATE_1: 250
MC_CV_MDC_IDC_SHOCK_MEASURED_IMPEDANCE: 125
MC_CV_MDC_IDC_THERAPIES: NORMAL
MC_CV_MDC_IDC_THERAPIES: NORMAL
MC_CV_MDC_IDC_ZONE_ID: 1
MC_CV_MDC_IDC_ZONE_ID: 2
MDC_IDC_MSMT_BATTERY_REMAINING_PERCENTAGE: 89 %
MDC_IDC_MSMT_BATTERY_STATUS: NORMAL
MDC_IDC_PG_IMPLANT_DTM: NORMAL
MDC_IDC_PG_MFG: NORMAL
MDC_IDC_PG_MODEL: NORMAL
MDC_IDC_PG_SERIAL: NORMAL
MDC_IDC_PG_TYPE: NORMAL
MDC_IDC_SESS_DTM: NORMAL
MDC_IDC_SESS_TYPE: NORMAL
MDC_IDC_SET_ZONE_STATUS: NORMAL
MDC_IDC_SET_ZONE_STATUS: NORMAL
MDC_IDC_SET_ZONE_TYPE: NORMAL
MDC_IDC_SET_ZONE_TYPE: NORMAL
MDC_IDC_STAT_TACHYTHERAPY_SHOCKS_DELIVERED_RECENT: 0

## (undated) DEVICE — GLIDESHEATH SLENDER STAINLESS STEEL KIT: Brand: GLIDESHEATH SLENDER

## (undated) DEVICE — TUBING, SUCTION, 1/4" X 10', STRAIGHT: Brand: MEDLINE

## (undated) DEVICE — LEX ELECTRO PHYSIOLOGY: Brand: MEDLINE INDUSTRIES, INC.

## (undated) DEVICE — ELECTRD RETRN/GRND MEGADYNE SGL/PLT W/CORD 9FT DISP

## (undated) DEVICE — MEDI-VAC YANKAUER SUCTION HANDLE W/BULBOUS TIP: Brand: CARDINAL HEALTH

## (undated) DEVICE — LIMB HOLDER, WRIST/ANKLE: Brand: DEROYAL

## (undated) DEVICE — ST INF PRI SMRTSTE 20DRP 2VLV 24ML 117

## (undated) DEVICE — SOL NACL 0.9PCT 1000ML

## (undated) DEVICE — CATH F6INF TL JR 4 100CM: Brand: INFINITI

## (undated) DEVICE — BND COMPR ZEPHYR VASC LG

## (undated) DEVICE — DRSNG SURESITE123 4X4.8IN

## (undated) DEVICE — SET PRIMARY GRVTY 10DP MALE LL 104IN

## (undated) DEVICE — PLASMABLADE PS210-030S 3.0S LOCK: Brand: PLASMABLADE™

## (undated) DEVICE — GW EMR FIX EXCHG J STD .035 3MM 260CM

## (undated) DEVICE — ADULT, W/LG. BACK PAD, RADIOTRANSPARENT ELEMENT AND LEAD WIRE COMPATIBLE W/: Brand: DEFIBRILLATION ELECTRODES

## (undated) DEVICE — RADIFOCUS OPTITORQUE ANGIOGRAPHIC CATHETER: Brand: OPTITORQUE

## (undated) DEVICE — PAD,NON-ADHERENT,3X8,STERILE,LF,1/PK: Brand: MEDLINE

## (undated) DEVICE — ADULT NASAL CO2 SAMPLING WITH O2 DELIVERY CANNULA FOR CAPNOFLEX MODULE: Brand: VITAL SIGNS™

## (undated) DEVICE — ST EXT IV SMRTSTE 2VLV FIX M LL 6ML 41